# Patient Record
Sex: FEMALE | Race: WHITE | Employment: FULL TIME | URBAN - METROPOLITAN AREA
[De-identification: names, ages, dates, MRNs, and addresses within clinical notes are randomized per-mention and may not be internally consistent; named-entity substitution may affect disease eponyms.]

---

## 2017-04-02 ENCOUNTER — HOSPITAL ENCOUNTER (INPATIENT)
Age: 42
LOS: 1 days | Discharge: HOME | DRG: 885 | End: 2017-04-04
Attending: EMERGENCY MEDICINE
Payer: COMMERCIAL

## 2017-04-02 DIAGNOSIS — F32.A DEPRESSION, UNSPECIFIED DEPRESSION TYPE: Primary | ICD-10-CM

## 2017-04-02 DIAGNOSIS — R45.851 SUICIDAL IDEATION: ICD-10-CM

## 2017-04-02 DIAGNOSIS — Z00.00 GENERAL MEDICAL EXAM: ICD-10-CM

## 2017-04-02 LAB
ALBUMIN SERPL BCP-MCNC: 3.9 G/DL (ref 3.5–5)
ALBUMIN/GLOB SERPL: 1 {RATIO} (ref 1.1–2.2)
ALP SERPL-CCNC: 112 U/L (ref 45–117)
ALT SERPL-CCNC: 31 U/L (ref 12–78)
AMPHET UR QL SCN: NEGATIVE
ANION GAP BLD CALC-SCNC: 8 MMOL/L (ref 5–15)
APAP SERPL-MCNC: <2 UG/ML (ref 10–30)
APPEARANCE UR: ABNORMAL
AST SERPL W P-5'-P-CCNC: 17 U/L (ref 15–37)
BACTERIA URNS QL MICRO: ABNORMAL /HPF
BARBITURATES UR QL SCN: NEGATIVE
BASOPHILS # BLD AUTO: 0 K/UL (ref 0–0.1)
BASOPHILS # BLD: 0 % (ref 0–1)
BENZODIAZ UR QL: NEGATIVE
BILIRUB SERPL-MCNC: 0.3 MG/DL (ref 0.2–1)
BILIRUB UR QL: NEGATIVE
BUN SERPL-MCNC: 15 MG/DL (ref 6–20)
BUN/CREAT SERPL: 16 (ref 12–20)
CALCIUM SERPL-MCNC: 9.4 MG/DL (ref 8.5–10.1)
CANNABINOIDS UR QL SCN: NEGATIVE
CHLORIDE SERPL-SCNC: 105 MMOL/L (ref 97–108)
CO2 SERPL-SCNC: 28 MMOL/L (ref 21–32)
COCAINE UR QL SCN: NEGATIVE
COLOR UR: ABNORMAL
CREAT SERPL-MCNC: 0.94 MG/DL (ref 0.55–1.02)
DRUG SCRN COMMENT,DRGCM: NORMAL
EOSINOPHIL # BLD: 0.2 K/UL (ref 0–0.4)
EOSINOPHIL NFR BLD: 2 % (ref 0–7)
EPITH CASTS URNS QL MICRO: ABNORMAL /LPF
ERYTHROCYTE [DISTWIDTH] IN BLOOD BY AUTOMATED COUNT: 12.4 % (ref 11.5–14.5)
ETHANOL SERPL-MCNC: <10 MG/DL
GLOBULIN SER CALC-MCNC: 4 G/DL (ref 2–4)
GLUCOSE SERPL-MCNC: 87 MG/DL (ref 65–100)
GLUCOSE UR STRIP.AUTO-MCNC: NEGATIVE MG/DL
HCG UR QL: NEGATIVE
HCG UR QL: NEGATIVE
HCT VFR BLD AUTO: 41.1 % (ref 35–47)
HGB BLD-MCNC: 13.9 G/DL (ref 11.5–16)
HGB UR QL STRIP: ABNORMAL
HYALINE CASTS URNS QL MICRO: ABNORMAL /LPF (ref 0–5)
KETONES UR QL STRIP.AUTO: NEGATIVE MG/DL
LEUKOCYTE ESTERASE UR QL STRIP.AUTO: NEGATIVE
LYMPHOCYTES # BLD AUTO: 27 % (ref 12–49)
LYMPHOCYTES # BLD: 2.4 K/UL (ref 0.8–3.5)
MCH RBC QN AUTO: 31 PG (ref 26–34)
MCHC RBC AUTO-ENTMCNC: 33.8 G/DL (ref 30–36.5)
MCV RBC AUTO: 91.5 FL (ref 80–99)
METHADONE UR QL: NEGATIVE
MONOCYTES # BLD: 0.5 K/UL (ref 0–1)
MONOCYTES NFR BLD AUTO: 6 % (ref 5–13)
NEUTS SEG # BLD: 5.9 K/UL (ref 1.8–8)
NEUTS SEG NFR BLD AUTO: 65 % (ref 32–75)
NITRITE UR QL STRIP.AUTO: NEGATIVE
OPIATES UR QL: NEGATIVE
PCP UR QL: NEGATIVE
PH UR STRIP: 5 [PH] (ref 5–8)
PLATELET # BLD AUTO: 235 K/UL (ref 150–400)
POTASSIUM SERPL-SCNC: 3.9 MMOL/L (ref 3.5–5.1)
PROT SERPL-MCNC: 7.9 G/DL (ref 6.4–8.2)
PROT UR STRIP-MCNC: NEGATIVE MG/DL
RBC # BLD AUTO: 4.49 M/UL (ref 3.8–5.2)
RBC #/AREA URNS HPF: ABNORMAL /HPF (ref 0–5)
SALICYLATES SERPL-MCNC: <1.7 MG/DL (ref 2.8–20)
SODIUM SERPL-SCNC: 141 MMOL/L (ref 136–145)
SP GR UR REFRACTOMETRY: 1.02 (ref 1–1.03)
UROBILINOGEN UR QL STRIP.AUTO: 0.2 EU/DL (ref 0.2–1)
WBC # BLD AUTO: 9 K/UL (ref 3.6–11)
WBC URNS QL MICRO: ABNORMAL /HPF (ref 0–4)

## 2017-04-02 PROCEDURE — 80307 DRUG TEST PRSMV CHEM ANLYZR: CPT | Performed by: EMERGENCY MEDICINE

## 2017-04-02 PROCEDURE — 36415 COLL VENOUS BLD VENIPUNCTURE: CPT | Performed by: EMERGENCY MEDICINE

## 2017-04-02 PROCEDURE — 80053 COMPREHEN METABOLIC PANEL: CPT | Performed by: EMERGENCY MEDICINE

## 2017-04-02 PROCEDURE — 90791 PSYCH DIAGNOSTIC EVALUATION: CPT

## 2017-04-02 PROCEDURE — 81001 URINALYSIS AUTO W/SCOPE: CPT | Performed by: EMERGENCY MEDICINE

## 2017-04-02 PROCEDURE — 85025 COMPLETE CBC W/AUTO DIFF WBC: CPT | Performed by: EMERGENCY MEDICINE

## 2017-04-02 PROCEDURE — 81025 URINE PREGNANCY TEST: CPT

## 2017-04-02 PROCEDURE — 99284 EMERGENCY DEPT VISIT MOD MDM: CPT

## 2017-04-02 NOTE — IP AVS SNAPSHOT
[address] 
[phone] Patient: Sangita Rich MRN: [id number] :1975 You are allergic to the following Allergen Reactions Latex Itching [place] Anaphylaxis Clotrimazole Swelling Lavender (Lavandula Angustifolia) Shortness of Breath Recent Documentation Height Weight BMI OB Status Smoking Status 1.651 m 91.4 kg 33.55 kg/m2 Having regular periods Never Smoker About your hospitalization You were admitted on:  April 3, 2017 You last received care in the:  [address] You were discharged on:  2017 Unit phone number:  [phone] Why you were hospitalized Your primary diagnosis was:  Bipolar 1 Disorder (Hcc) Providers Seen During Your Hospitalizations Provider Role Specialty Primary office phone Nelson Metz MD Attending Provider Emergency Medicine [phone] Pallavi Leonardo MD Attending Provider Psychiatry [phone] Liliana Calle MD Attending Provider Psychiatry [phone] Your Primary Care Physician (PCP) Primary Care Physician Office Phone Office Fax OTHER, PHYS ** None ** ** None ** Follow-up Information Follow up With Details Comments Contact Info Addy Ying MD   Patient can only remember the practice name and not the physician Jose Rios On 2017 You have a 4:00pm appointment for medication management. [facility] 
[address], [facility] [address] [place], [address] [facility] 
[phone] [facility]  Couples counseling available here JERONIMO Vargas [address] [place], [address] 
[phone] [facility]  Call to schedule first time counseling appointment. 1000 [facility] [address] [place], [address] 
[phone] Fax [phone] Mavis Martel, PhD  Call to schedule first time counseling appointment. [address] [address] Louie Alexander 
[phone] Becky Vickers [facility]  Call to schedule first time counseling appointment. [address] [place], [address] 
[phone] Current Discharge Medication List  
  
CONTINUE these medications which have CHANGED Dose & Instructions Dispensing Information Comments Morning Noon Evening Bedtime  
 risperiDONE 1 mg tablet Commonly known as:  RisperDAL What changed:   
- medication strength 
- how much to take - when to take this Dose:  1 mg Take 1 Tab by mouth nightly. Indications: DEPRESSION ASSOCIATED WITH BIPOLAR DISORDER Quantity:  30 Tab Refills:  0 CONTINUE these medications which have NOT CHANGED Dose & Instructions Dispensing Information Comments Morning Noon Evening Bedtime ALEVE 220 mg Cap Generic drug:  naproxen sodium Dose:  1 Tab Take 1 Tab by mouth two (2) times daily as needed for Pain. Indications: Pain Refills:  0 ALLEGRA-D 24 HOUR 180-240 mg per tablet Generic drug:  fexofenadine-pseudoephedrine Dose:  1 Tab Take 1 Tab by mouth daily. Indications: ALLERGIC RHINITIS Refills:  0 CeleXA 40 mg tablet Generic drug:  citalopram  
   
 Dose:  40 mg Take 40 mg by mouth daily. Indications: major depressive disorder Refills:  0  
     
  
   
   
   
  
 cholecalciferol (vitamin D3) 4,000 unit Tab Dose:  4000 Units Take 4,000 Units by mouth daily. Indications: VITAMIN D DEFICIENCY Refills:  0  
     
  
   
   
   
  
 COLACE 100 mg capsule Generic drug:  docusate sodium Dose:  100 mg Take 100 mg by mouth daily. Indications: Constipation Refills:  0  
     
  
   
   
   
  
 lactase 3,000 unit tablet Commonly known as:  Jose Maria Ducking Dose:  1 Tab Take 1 Tab by mouth three (3) times daily as needed for Other (LACTOSE INTOLERANCE). Refills:  0 LaMICtal 150 mg tablet Generic drug:  lamoTRIgine Dose:  150 mg Take 150 mg by mouth two (2) times a day. Indications: MOOD Refills:  0 Where to Get Your Medications Information on where to get these meds will be given to you by the nurse or doctor. ! Ask your nurse or doctor about these medications  
  risperiDONE 1 mg tablet Discharge Instructions DISCHARGE SUMMARY 
 
NAME:Cherelle Ellis : 1975 MRN: [id number] The patient Meenakshi Valerio exhibits the ability to control behavior in a less restrictive environment. Patient's level of functioning is improving. No assaultive/destructive behavior has been observed for the past 24 hours. No suicidal/homicidal threat or behavior has been observed for the past 24 hours. There is no evidence of serious medication side effects. Patient has not been in physical or protective restraints for at least the past 24 hours. If weapons involved, how are they secured? No weapons involved. Is patient aware of and in agreement with discharge plan? Yes Arrangements for medication:  Prescriptions given to patient. Patient is a smoker and needs referral for smoking cessation? Patient is not a smoker. 1.  Patient referred to the following for smoking cessation with an appointment: 2. Patient was offered medication to assist with smoking cessation at discharge: 3.  Education for smoking cessation added to discharge instructions:  
 
Patient has a history of substance/alcohol abuse and requires a referral for treatment? No 
1. Patient referred to the following for substance/alcohol abuse treatment with an appointment: 2.  Patient was offered medication to assist with alcohol cessation at discharge: 3.  Education for substance/alcohol abuse added to discharge instructions:  
 
Copy of discharge instructions to  provider?:  Leopoldo Santo ([phone]) Arrangements for transportation home:   to  Psychiatric Advanced Care Directives- no 
Name of Decision maker if patient has Psychiatric Care Directive: None Patient was offered information, patient declined information. Keep all follow up appointments as scheduled, continue to take prescribed medications per physician instructions. Mental health crisis number:  636 or your local mental health crisis line number at [phone] DISCHARGE SUMMARY from Nurse The following personal items are in your possession at time of discharge: 
 
Dental Appliances: Other (comment) (mouth guard) Visual Aid: Glasses, With patient Home Medications: Locked Jewelry: None Clothing: Footwear, Pants, Shirt, Shorts, Socks, Sweater, Undergarments Other Valuables: Cell Phone, Eyeglasses, Keys, Personal toiletries, Purse, Other (comment) (adapter, power cords, braces(wrists)) Personal Items Sent to Safe:  (credit cards, store cards, ipad tablet, license) PATIENT INSTRUCTIONS: 
 
 
 
 
What to do at Home: 
Recommended activity: Activity as tolerated. If you experience any of the following symptoms thoughts of wanting to harm yourself or overwhelming anxiety, please follow up with 911 or your local mental health crisis line number at [phone]. *  Please give a list of your current medications to your Primary Care Provider. *  Please update this list whenever your medications are discontinued, doses are 
    changed, or new medications (including over-the-counter products) are added. *  Please carry medication information at all times in case of emergency situations. These are general instructions for a healthy lifestyle: No smoking/ No tobacco products/ Avoid exposure to second hand smoke Surgeon General's Warning:  Quitting smoking now greatly reduces serious risk to your health. Obesity, smoking, and sedentary lifestyle greatly increases your risk for illness A healthy diet, regular physical exercise & weight monitoring are important for maintaining a healthy lifestyle You may be retaining fluid if you have a history of heart failure or if you experience any of the following symptoms:  Weight gain of 3 pounds or more overnight or 5 pounds in a week, increased swelling in our hands or feet or shortness of breath while lying flat in bed. Please call your doctor as soon as you notice any of these symptoms; do not wait until your next office visit. Recognize signs and symptoms of STROKE: 
 
F-face looks uneven A-arms unable to move or move unevenly S-speech slurred or non-existent T-time-call 911 as soon as signs and symptoms begin-DO NOT go Back to bed or wait to see if you get better-TIME IS BRAIN. Warning Signs of HEART ATTACK Call 911 if you have these symptoms: 
? Chest discomfort. Most heart attacks involve discomfort in the center of the chest that lasts more than a few minutes, or that goes away and comes back. It can feel like uncomfortable pressure, squeezing, fullness, or pain. ? Discomfort in other areas of the upper body. Symptoms can include pain or discomfort in one or both arms, the back, neck, jaw, or stomach. ? Shortness of breath with or without chest discomfort. ? Other signs may include breaking out in a cold sweat, nausea, or lightheadedness. Don't wait more than five minutes to call [address]! Fast action can save your life. Calling 911 is almost always the fastest way to get lifesaving treatment. Emergency Medical Services staff can begin treatment when they arrive  up to an hour sooner than if someone gets to the hospital by car. The discharge information has been reviewed with the patient. The patient verbalized understanding. Discharge medications reviewed with the patient and appropriate educational materials and side effects teaching were provided. [facility]charge Orders [facility] Announcement We are excited to announce that we are making your provider's discharge notes available to you in [employer]. You will see these notes when they are completed and signed by the physician that discharged you from your recent hospital stay. If you have any questions or concerns about any information you see in [employer], please call the Health Information Department where you were seen or reach out to your Primary Care Provider for more information about your plan of care. Introducing [facility] [place] [facility]! [employer] introduces [employer] patient portal. Now you can access parts of your medical record, email your doctor's office, and request medication refills online. 1. In your internet browser, go to [email or web address] 2. Click on the First Time User? Click Here link in the Sign In box. You will see the New Member Sign Up page. 3. Enter your [employer] Access Code exactly as it appears below. You will not need to use this code after youve completed the sign-up process. If you do not sign up before the expiration date, you must request a new code. · [employer] Access Code: [email or web address] Expires: 7/1/2017  8:20 PM 
 
4. Enter the last four digits of your Social Security Number (xxxx) and Date of Birth (mm/dd/yyyy) as indicated and click Submit. You will be taken to the next sign-up page. 5. Create a [employer] ID. This will be your [employer] login ID and cannot be changed, so think of one that is secure and easy to remember. 6. Create a [employer] password. You can change your password at any time. 7. Enter your Password Reset Question and Answer.  This can be used at a later time if you forget your password. 8. Enter your e-mail address. You will receive e-mail notification when new information is available in [address] 9. Click Sign Up. You can now view and download portions of your medical record. 10. Click the Download Summary menu link to download a portable copy of your medical information. If you have questions, please visit the Frequently Asked Questions section of the [employer] website. Remember, [employer] is NOT to be used for urgent needs. For medical emergencies, dial 911. Now available from your iPhone and Android! General Information Please provide this summary of care documentation to your next provider. Patient Signature:  ____________________________________________________________ Date:  ____________________________________________________________  
  
[facility] Provider Signature:  ____________________________________________________________ Date:  ____________________________________________________________

## 2017-04-02 NOTE — IP AVS SNAPSHOT
Current Discharge Medication List  
  
CONTINUE these medications which have CHANGED Dose & Instructions Dispensing Information Comments Morning Noon Evening Bedtime  
 risperiDONE 1 mg tablet Commonly known as:  RisperDAL What changed:   
- medication strength 
- how much to take - when to take this Dose:  1 mg Take 1 Tab by mouth nightly. Indications: DEPRESSION ASSOCIATED WITH BIPOLAR DISORDER Quantity:  30 Tab Refills:  0 CONTINUE these medications which have NOT CHANGED Dose & Instructions Dispensing Information Comments Morning Noon Evening Bedtime ALEVE 220 mg Cap Generic drug:  naproxen sodium Dose:  1 Tab Take 1 Tab by mouth two (2) times daily as needed for Pain. Indications: Pain Refills:  0 ALLEGRA-D 24 HOUR 180-240 mg per tablet Generic drug:  fexofenadine-pseudoephedrine Dose:  1 Tab Take 1 Tab by mouth daily. Indications: ALLERGIC RHINITIS Refills:  0 CeleXA 40 mg tablet Generic drug:  citalopram  
   
 Dose:  40 mg Take 40 mg by mouth daily. Indications: major depressive disorder Refills:  0  
     
  
   
   
   
  
 cholecalciferol (vitamin D3) 4,000 unit Tab Dose:  4000 Units Take 4,000 Units by mouth daily. Indications: VITAMIN D DEFICIENCY Refills:  0  
     
  
   
   
   
  
 COLACE 100 mg capsule Generic drug:  docusate sodium Dose:  100 mg Take 100 mg by mouth daily. Indications: Constipation Refills:  0  
     
  
   
   
   
  
 lactase 3,000 unit tablet Commonly known as:  [place] Dose:  1 Tab Take 1 Tab by mouth three (3) times daily as needed for Other (LACTOSE INTOLERANCE). Refills:  0 LaMICtal 150 mg tablet Generic drug:  lamoTRIgine Dose:  150 mg Take 150 mg by mouth two (2) times a day. Indications: MOOD Refills:  0 Where to Get Your Medications Information on where to get these meds will be given to you by the nurse or doctor. ! Ask your nurse or doctor about these medications  
  risperiDONE 1 mg tablet

## 2017-04-02 NOTE — IP AVS SNAPSHOT
Summary of Care Report The Summary of Care report has been created to help improve care coordination. Users with access to [employer] or [address] (Web-based application) may access additional patient information including the Discharge Summary. If you are not currently a [address] user and need more information, please call the number listed below in the [place] [address] section and ask to be connected with Medical Records. Facility Information Name Address Phone [address] [phone] [phone] Patient Information Patient Name Sex  Zack Sigala A ([id number]) Female 1975 Discharge Information Admitting Provider Service Area Unit Lucy Lovett MD / [phone] [address] [facility] / [phone] Discharge Provider Discharge Date/Time Discharge Disposition Destination Irma Hutchison MD / [phone] 17 1452 [facility] (none) Patient Language Language ENGLISH [13] Hospital Problems as of 2017  Reviewed: 4/3/2017  1:10 PM by Irma Hutchison MD  
  
  
  
 Class Noted - Resolved Last Modified POA Active Problems * (Principal)Bipolar 1 disorder ([facility] [address]  4/3/2017 - Present 4/3/2017 by Irma Hutchison MD Unknown Entered by Lucy Lovett MD  
  
Non-Hospital Problems as of 2017  Reviewed: 4/3/2017  1:10 PM by Irma Hutchison MD  
 None You are allergic to the following Allergen Reactions Latex Itching [place] Anaphylaxis Clotrimazole Swelling Lavender (Lavandula Angustifolia) Shortness of Breath Current Discharge Medication List  
  
CONTINUE these medications which have CHANGED Dose & Instructions Dispensing Information Comments  
 risperiDONE 1 mg tablet Commonly known as:  RisperDAL What changed:   
- medication strength - how much to take - when to take this Dose:  1 mg Take 1 Tab by mouth nightly. Indications: DEPRESSION ASSOCIATED WITH BIPOLAR DISORDER Quantity:  30 Tab Refills:  0 CONTINUE these medications which have NOT CHANGED Dose & Instructions Dispensing Information Comments ALEVE 220 mg Cap Generic drug:  naproxen sodium Dose:  1 Tab Take 1 Tab by mouth two (2) times daily as needed for Pain. Indications: Pain Refills:  0 ALLEGRA-D 24 HOUR 180-240 mg per tablet Generic drug:  fexofenadine-pseudoephedrine Dose:  1 Tab Take 1 Tab by mouth daily. Indications: ALLERGIC RHINITIS Refills:  0 CeleXA 40 mg tablet Generic drug:  citalopram  
 Dose:  40 mg Take 40 mg by mouth daily. Indications: major depressive disorder Refills:  0  
   
 cholecalciferol (vitamin D3) 4,000 unit Tab Dose:  4000 Units Take 4,000 Units by mouth daily. Indications: VITAMIN D DEFICIENCY Refills:  0  
   
 COLACE 100 mg capsule Generic drug:  docusate sodium Dose:  100 mg Take 100 mg by mouth daily. Indications: Constipation Refills:  0  
   
 lactase 3,000 unit tablet Commonly known as:  Hui [place] Dose:  1 Tab Take 1 Tab by mouth three (3) times daily as needed for Other (LACTOSE INTOLERANCE). Refills:  0 LaMICtal 150 mg tablet Generic drug:  lamoTRIgine Dose:  150 mg Take 150 mg by mouth two (2) times a day. Indications: MOOD Refills:  0 Follow-up Information Follow up With Details Comments Contact Info Addy Ying MD   Patient can only remember the practice name and not the physician Delonte Diaz On 4/18/2017 You have a 4:00pm appointment for medication management. [facility] 
[address], [facility] [address] [place], [address] [facility] 
[phone] [facility]  Couples counseling available here JERONIMO Vargas [address] [place], [address] [place] [address] 
[phone] [facility]  Call to schedule first time counseling appointment. [address] [facility] [address] [place], [address] 
[phone] Fax [phone] Mavis Bush, PhD  Call to schedule first time counseling appointment. [address] [facility] 
[phone] Mare Schmitz. [facility]  Call to schedule first time counseling appointment. [address] [place], [address] 
[phone] Discharge Instructions DISCHARGE SUMMARY 
 
NAME:Cherelle Liu : 1975 MRN: [id number] The patient 71 Dany Valerio exhibits the ability to control behavior in a less restrictive environment. Patient's level of functioning is improving. No assaultive/destructive behavior has been observed for the past 24 hours. No suicidal/homicidal threat or behavior has been observed for the past 24 hours. There is no evidence of serious medication side effects. Patient has not been in physical or protective restraints for at least the past 24 hours. If weapons involved, how are they secured? No weapons involved. Is patient aware of and in agreement with discharge plan? Yes Arrangements for medication:  Prescriptions given to patient. Patient is a smoker and needs referral for smoking cessation? Patient is not a smoker. 1.  Patient referred to the following for smoking cessation with an appointment: 2. Patient was offered medication to assist with smoking cessation at discharge: 3.  Education for smoking cessation added to discharge instructions:  
 
Patient has a history of substance/alcohol abuse and requires a referral for treatment? No 
1. Patient referred to the following for substance/alcohol abuse treatment with an appointment: 2. Patient was offered medication to assist with alcohol cessation at discharge: 3.  Education for substance/alcohol abuse added to discharge instructions:  
 
Copy of discharge instructions to  provider?:  Serge Ramirez ([phone]) Arrangements for transportation home:   to  Psychiatric Advanced Care Directives- no 
Name of Decision maker if patient has Psychiatric Care Directive: None Patient was offered information, patient declined information. Keep all follow up appointments as scheduled, continue to take prescribed medications per physician instructions. Mental health crisis number:  013 or your local mental health crisis line number at [phone] DISCHARGE SUMMARY from Nurse The following personal items are in your possession at time of discharge: 
 
Dental Appliances: Other (comment) (mouth guard) Visual Aid: Glasses, With patient Home Medications: Locked Jewelry: None Clothing: Footwear, Pants, Shirt, Shorts, Socks, Sweater, Undergarments Other Valuables: Cell Phone, Eyeglasses, Keys, Personal toiletries, Purse, Other (comment) (adapter, power cords, braces(wrists)) Personal Items Sent to Safe:  (credit cards, store cards, ipad tablet, license) PATIENT INSTRUCTIONS: 
 
 
 
 
What to do at Home: 
Recommended activity: Activity as tolerated. If you experience any of the following symptoms thoughts of wanting to harm yourself or overwhelming anxiety, please follow up with 911 or your local mental health crisis line number at [phone]. *  Please give a list of your current medications to your Primary Care Provider. *  Please update this list whenever your medications are discontinued, doses are 
    changed, or new medications (including over-the-counter products) are added. *  Please carry medication information at all times in case of emergency situations. These are general instructions for a healthy lifestyle: No smoking/ No tobacco products/ Avoid exposure to second hand smoke Surgeon General's Warning:  Quitting smoking now greatly reduces serious risk to your health. Obesity, smoking, and sedentary lifestyle greatly increases your risk for illness A healthy diet, regular physical exercise & weight monitoring are important for maintaining a healthy lifestyle You may be retaining fluid if you have a history of heart failure or if you experience any of the following symptoms:  Weight gain of 3 pounds or more overnight or 5 pounds in a week, increased swelling in our hands or feet or shortness of breath while lying flat in bed. Please call your doctor as soon as you notice any of these symptoms; do not wait until your next office visit. Recognize signs and symptoms of STROKE: 
 
F-face looks uneven A-arms unable to move or move unevenly S-speech slurred or non-existent T-time-call 911 as soon as signs and symptoms begin-DO NOT go Back to bed or wait to see if you get better-TIME IS BRAIN. Warning Signs of HEART ATTACK Call 911 if you have these symptoms: 
? Chest discomfort. Most heart attacks involve discomfort in the center of the chest that lasts more than a few minutes, or that goes away and comes back. It can feel like uncomfortable pressure, squeezing, fullness, or pain. ? Discomfort in other areas of the upper body. Symptoms can include pain or discomfort in one or both arms, the back, neck, jaw, or stomach. ? Shortness of breath with or without chest discomfort. ? Other signs may include breaking out in a cold sweat, nausea, or lightheadedness. Don't wait more than five minutes to call [address]! Fast action can save your life. Calling 911 is almost always the fastest way to get lifesaving treatment. Emergency Medical Services staff can begin treatment when they arrive  up to an hour sooner than if someone gets to the hospital by car. The discharge information has been reviewed with the patient.   The patient verbalized understanding. Discharge medications reviewed with the patient and appropriate educational materials and side effects teaching were provided. Chart Review Routing History No Routing History on File

## 2017-04-03 PROBLEM — F31.9 BIPOLAR 1 DISORDER (HCC): Status: ACTIVE | Noted: 2017-04-03

## 2017-04-03 PROCEDURE — 74011250637 HC RX REV CODE- 250/637

## 2017-04-03 PROCEDURE — 65220000003 HC RM SEMIPRIVATE PSYCH

## 2017-04-03 PROCEDURE — 74011250637 HC RX REV CODE- 250/637: Performed by: PSYCHIATRY & NEUROLOGY

## 2017-04-03 RX ORDER — RISPERIDONE 0.5 MG/1
0.5 TABLET, FILM COATED ORAL
Status: DISCONTINUED | OUTPATIENT
Start: 2017-04-03 | End: 2017-04-03

## 2017-04-03 RX ORDER — RISPERIDONE 0.5 MG/1
0.5 TABLET, FILM COATED ORAL
COMMUNITY
End: 2017-04-04

## 2017-04-03 RX ORDER — OLANZAPINE 5 MG/1
5 TABLET ORAL
Status: DISCONTINUED | OUTPATIENT
Start: 2017-04-03 | End: 2017-04-04 | Stop reason: HOSPADM

## 2017-04-03 RX ORDER — RISPERIDONE 0.5 MG/1
0.5 TABLET, FILM COATED ORAL
Status: DISCONTINUED | OUTPATIENT
Start: 2017-04-03 | End: 2017-04-04

## 2017-04-03 RX ORDER — FEXOFENADINE HCL AND PSEUDOEPHEDRINE HCI 180; 240 MG/1; MG/1
1 TABLET, EXTENDED RELEASE ORAL DAILY
COMMUNITY

## 2017-04-03 RX ORDER — RISPERIDONE 1 MG/ML
0.5 SOLUTION ORAL
Status: DISCONTINUED | OUTPATIENT
Start: 2017-04-03 | End: 2017-04-03

## 2017-04-03 RX ORDER — ADHESIVE BANDAGE
30 BANDAGE TOPICAL DAILY PRN
Status: DISCONTINUED | OUTPATIENT
Start: 2017-04-03 | End: 2017-04-04 | Stop reason: HOSPADM

## 2017-04-03 RX ORDER — CITALOPRAM 20 MG/1
40 TABLET, FILM COATED ORAL ONCE
Status: COMPLETED | OUTPATIENT
Start: 2017-04-03 | End: 2017-04-03

## 2017-04-03 RX ORDER — ACETAMINOPHEN 325 MG/1
650 TABLET ORAL
Status: DISCONTINUED | OUTPATIENT
Start: 2017-04-03 | End: 2017-04-04 | Stop reason: HOSPADM

## 2017-04-03 RX ORDER — DOCUSATE SODIUM 100 MG/1
100 CAPSULE, LIQUID FILLED ORAL DAILY
Status: DISCONTINUED | OUTPATIENT
Start: 2017-04-03 | End: 2017-04-04 | Stop reason: HOSPADM

## 2017-04-03 RX ORDER — LORAZEPAM 1 MG/1
1 TABLET ORAL
Status: DISCONTINUED | OUTPATIENT
Start: 2017-04-03 | End: 2017-04-04 | Stop reason: HOSPADM

## 2017-04-03 RX ORDER — CITALOPRAM 20 MG/1
40 TABLET, FILM COATED ORAL DAILY
Status: DISCONTINUED | OUTPATIENT
Start: 2017-04-04 | End: 2017-04-04 | Stop reason: HOSPADM

## 2017-04-03 RX ORDER — LAMOTRIGINE 150 MG/1
150 TABLET ORAL 2 TIMES DAILY
COMMUNITY

## 2017-04-03 RX ORDER — LORAZEPAM 2 MG/ML
2 INJECTION INTRAMUSCULAR
Status: DISCONTINUED | OUTPATIENT
Start: 2017-04-03 | End: 2017-04-04 | Stop reason: HOSPADM

## 2017-04-03 RX ORDER — CHOLECALCIFEROL (VITAMIN D3) 125 MCG
1 CAPSULE ORAL
COMMUNITY
Start: 2017-03-16 | End: 2017-04-07

## 2017-04-03 RX ORDER — IBUPROFEN 200 MG
1 TABLET ORAL
Status: DISCONTINUED | OUTPATIENT
Start: 2017-04-03 | End: 2017-04-04 | Stop reason: HOSPADM

## 2017-04-03 RX ORDER — ZOLPIDEM TARTRATE 5 MG/1
5 TABLET ORAL
Status: DISCONTINUED | OUTPATIENT
Start: 2017-04-03 | End: 2017-04-04 | Stop reason: HOSPADM

## 2017-04-03 RX ORDER — RISPERIDONE 0.5 MG/1
0.5 TABLET, FILM COATED ORAL DAILY
Status: DISCONTINUED | OUTPATIENT
Start: 2017-04-03 | End: 2017-04-03

## 2017-04-03 RX ORDER — BENZTROPINE MESYLATE 1 MG/ML
2 INJECTION INTRAMUSCULAR; INTRAVENOUS
Status: DISCONTINUED | OUTPATIENT
Start: 2017-04-03 | End: 2017-04-04 | Stop reason: HOSPADM

## 2017-04-03 RX ORDER — CHOLECALCIFEROL TAB 125 MCG (5000 UNIT) 125 MCG
5000 TAB ORAL DAILY
Status: ON HOLD | COMMUNITY
End: 2017-04-03

## 2017-04-03 RX ORDER — CHOLECALCIFEROL (VITAMIN D3) 125 MCG
1 CAPSULE ORAL
COMMUNITY

## 2017-04-03 RX ORDER — RISPERIDONE 0.5 MG/1
0.5 TABLET, FILM COATED ORAL 2 TIMES DAILY
Status: DISCONTINUED | OUTPATIENT
Start: 2017-04-03 | End: 2017-04-04

## 2017-04-03 RX ORDER — BENZTROPINE MESYLATE 2 MG/1
2 TABLET ORAL
Status: DISCONTINUED | OUTPATIENT
Start: 2017-04-03 | End: 2017-04-04 | Stop reason: HOSPADM

## 2017-04-03 RX ORDER — DOCUSATE SODIUM 100 MG/1
100 CAPSULE, LIQUID FILLED ORAL DAILY
COMMUNITY

## 2017-04-03 RX ORDER — CITALOPRAM 40 MG/1
40 TABLET, FILM COATED ORAL DAILY
COMMUNITY

## 2017-04-03 RX ORDER — IBUPROFEN 400 MG/1
400 TABLET ORAL
Status: DISCONTINUED | OUTPATIENT
Start: 2017-04-03 | End: 2017-04-04 | Stop reason: HOSPADM

## 2017-04-03 RX ADMIN — RISPERIDONE 0.5 MG: 0.5 TABLET, FILM COATED ORAL at 17:06

## 2017-04-03 RX ADMIN — CITALOPRAM HYDROBROMIDE 40 MG: 20 TABLET ORAL at 14:10

## 2017-04-03 RX ADMIN — RISPERIDONE 0.5 MG: 0.5 TABLET, FILM COATED ORAL at 05:14

## 2017-04-03 RX ADMIN — DOCUSATE SODIUM 100 MG: 100 CAPSULE, LIQUID FILLED ORAL at 05:14

## 2017-04-03 RX ADMIN — RISPERIDONE 0.5 MG: 0.5 TABLET, FILM COATED ORAL at 21:06

## 2017-04-03 RX ADMIN — LAMOTRIGINE 150 MG: 100 TABLET ORAL at 17:06

## 2017-04-03 NOTE — BSMART NOTE
Comprehensive Assessment Form Part 1      Section I - Disposition    Axis I - Bipolar Disorder   Axis II - Deferred  Axis III -   Past Medical History:   Diagnosis Date    ADHD (attention deficit hyperactivity disorder)     Bipolar 1 disorder ([facility] [address]        Axis IV - Relationship, financial and occupational stressors  Axis V - 27      The Medical Doctor to Psychiatrist conference was not completed. The Medical Doctor is in agreement with Psychiatrist disposition because of (reason) Voluntary admission is recommended. The plan is admit to general unit at [facility]. The on-call Psychiatrist consulted was Dr. Mirian Morris. The admitting Psychiatrist will be Dr. Ezekiel Machado. The admitting Diagnosis is Bipolar. The Payor source is  [email or web address]. Section II - Integrated Summary  Summary:  Patient came in reporting suicidal ideation. Patient reported she has ongoing thoughts when overwhelmed but tonight wants to follow through. Patient reported she kept wanting to \"open my wrist.\"  Patient denied prior attempts. Patient denied HI currently. Patient reported she and her  broke up with a girlfriend tonight which is the precipitant for this episode. Patient also reported recent charging on her credit card as a stressor as well as quitting her job Friday due to stress. Patient denied prior admissions. She is currently seeing Dr. Rodo Lopez in [facility] for therapy and Serge Ramirez NP for medication. Patient reported compliance with her Risperdal .5 mg qhs, Celexa 40 mg, and Lamictal 150 mg BID. Patient is alert, oriented, and cooperative. Patient reporting uncertainty as to what she might do if she returns home this evening. The patienthas demonstrated mental capacity to provide informed consent. The information is given by the patient. The Chief Complaint is SI. The Precipitant Factors are relationship, job, and financial issues.   Previous Hospitalizations: NA  The patient has not previously been in restraints. Current Psychiatrist and/or  is RONNIE Daly Dr.. Lethality Assessment:    The potential for suicide noted by the following: defined plan and ideation . The potential for homicide is not noted. The patient has not been a perpetrator of sexual or physical abuse. There are not pending charges. The patient is felt to be at risk for self harm or harm to others. The attending nurse was advised that security has not been notified. Section III - Psychosocial  The patient's overall mood and attitude is depressed. Feelings of helplessness and hopelessness are observed by verbal statements. Generalized anxiety is not observed. Panic is not observed. Phobias are not observed. Obsessive compulsive tendencies are not observed. Section IV - Mental Status Exam  The patient's appearance shows no evidence of impairment. The patient's behavior shows no evidence of impairment. The patient is oriented to time, place, person and situation. The patient's speech shows no evidence of impairment. The patient's mood is depressed. The range of affect is flat. The patient's thought content demonstrates no evidence of impairment. The thought process shows no evidence of impairment. The patient's perception shows no evidence of impairment. The patient's memory shows no evidence of impairment. The patient's appetite is decreased and shows signs of weight loss. The patient's sleep has evidence of hypersomnia. The patient shows no insight. The patient's judgement is psychologically impaired. Section V - Substance Abuse  The patient is not using substances. Section VI - Living Arrangements  The patient is . The patient lives with a spouse. The patient has no children. The patient does plan to return home upon discharge. The patient does not have legal issues pending. The patient's source of income comes from employment.   Jew and cultural practices have not been voiced at this time. The patient's greatest support comes from mom and a friend and this person will not be involved with the treatment. The patient has not been in an event described as horrible or outside the realm of ordinary life experience either currently or in the past.  The patient has not been a victim of sexual/physical abuse. Section VII - Other Areas of Clinical Concern  The highest grade achieved is college with the overall quality of school experience being described as NA. The patient is currently employed and speaks [place] as a primary language. The patient has no communication impairments affecting communication. The patient's preference for learning can be described as: can read and write adequately. The patient's hearing is normal.  The patient's vision is impaired and  wears glasses or contacts.       Diogenes Osman, JOSE

## 2017-04-03 NOTE — PROGRESS NOTES
Problem: Depressed Mood (Adult/Pediatric)  Goal: *STG: Participates in treatment plan  Outcome: Progressing Towards Goal  Review meds, out on unit social w peers and staff. Thoughts organized, logical and goal directed. Mood and affect sad, tearful and \"stressed\". Describes an open marriage and work as her top stressors. Insight into her twice a month therapy sessions as not effective and that her new job schedule does not allow for her to work with her current therapist. Requesting assistance in finding a new therapist. Medication compliant at home per her report. Denies SI at this time however describes racing negative thoughts. Pt daily goal is to talk with , meet with tx team and rest. Staff focus is on coping skills education, offering support and encourage progress towards goals.

## 2017-04-03 NOTE — ED TRIAGE NOTES
Patient arrives to ED with c/o suicidal ideations x 2 months, patient states stress in her relations as a stressor and remembering her fathers death 2 years ago, no attempt noted, patient contracts for safety while in the ED.

## 2017-04-03 NOTE — BH NOTES
GROUP THERAPY PROGRESS NOTE    71 Dany Valerio participated in an Afternoon Process Group on DBT STOP Distress Tolerance Skills. Group time: 45 minutes. Personal goal for participation: To better understand the possibility of recognizing signals of distress and STOP strategies to manage the potential for impulsive behavior. Goal orientation: The patient will be able to identify the bodily sensations, triggering thoughts and situations that might lead to impulsive behaviors one might wish to avoid. The patient will also consider how to begin practicing STOP to delay an unwanted distress response. Group therapy participation: With prompting, this patient actively participated in the group. Therapeutic interventions reviewed and discussed: The group members were asked to take turns reading from a handout and worksheet on DBT STOP Distress Tolerance Skills and discussing the suggestions. Distress signals included actions at or near an emotional loss of control, bodily sensations, thoughts that might accompany an emotional buildup, triggering events, and feelings associated with an emotional meltdown or explosion. They also reviewed STOP: 1) Consider what one might do to tell oneself to stop; 2) Pay attention to breathing; 3) How to observe and check facts in a nonjudgmental fashion; and 4) What one might do to proceed effectively. The group members were encouraged to keep the handout and worksheet. Impression of participation: The patient asked several questions and was using the worksheet to document her own answers. She looked fully oriented and engaged in the group session.

## 2017-04-03 NOTE — BH NOTES
0040  Pt arrived ambulatory onto 7W from ED. 45 y/o female pt admitted under Fernando Cloud/Tuan for Bipolar disorder. Pt said she had had SI, but contracted for safety now. She said she felt overwhelmed  & did not feel safe at home because of her SI which came when she & her  broke up with their girlfriend who went back to [place]. Pt is A&O; steady on her feet. No distress noted, but pt said she has chronic jaw pain which increases when she is stressed. Monitoring continues. Primary Nurse Apple Matthews RN and Ricki Lan RN performed a dual skin assessment on this patient No impairment noted  Manoj score is 23. Pt has a healed scar on distal right forearm/wrist area. No tattoos, rashes, or wounds noted on pt. Pressure Ulcer Prevention Protocol initiated. Pt walks often & is self care.

## 2017-04-03 NOTE — BH NOTES
Patient attended group and appeared juan fatigue as she expressed that she was just admitted at 1am this morning. Patient expressed her goal for today was to meet with treatment team and express her mental concerns.

## 2017-04-03 NOTE — ED PROVIDER NOTES
HPI Comments: 46 y.o. female with past medical history significant for bipolar 1 disorder and ADHD who presents from home with chief complaint of suicidal ideations. Pt complains of suicidal ideations. Pt reports that she has two sprained elbows and one sprained wrist. Pt notes that she has allergy induced asthma and is allergic to Latex, almond, Clotrimazole, and lavender. Pt reports that she has heavy and painful menstrual periods. Pt also reports that she has hemorrhoids and chronic constipation from her medications. Pt denies any thyroid problems. There are no other acute medical concerns at this time. Social hx: Non-smoker, no EtOH use. Note written by Angelito Bundy, as dictated by Marilin Urias MD 9:45 PM        The history is provided by the patient. No  was used. Past Medical History:   Diagnosis Date    ADHD (attention deficit hyperactivity disorder)     Bipolar 1 disorder ([facility] [address]        History reviewed. No pertinent surgical history. History reviewed. No pertinent family history. Social History     Social History    Marital status: SINGLE     Spouse name: N/A    Number of children: N/A    Years of education: N/A     Occupational History    Not on file. Social History Main Topics    Smoking status: Never Smoker    Smokeless tobacco: Not on file    Alcohol use No    Drug use: No    Sexual activity: Not on file     Other Topics Concern    Not on file     Social History Narrative    No narrative on file         ALLERGIES: Latex; [place]; Clotrimazole; and Lavender (lavandula angustifolia)    Review of Systems   Constitutional: Negative for activity change, appetite change and fatigue. HENT: Negative for ear pain, facial swelling, sore throat and trouble swallowing. Eyes: Negative for pain, discharge and visual disturbance. Respiratory: Negative for chest tightness, shortness of breath and wheezing.     Cardiovascular: Negative for chest pain and palpitations. Gastrointestinal: Positive for constipation. Negative for abdominal pain, blood in stool, nausea and vomiting. Genitourinary: Negative for difficulty urinating, flank pain and hematuria. Musculoskeletal: Negative for myalgias and neck pain. Skin: Negative for color change and rash. Neurological: Negative for dizziness, weakness, numbness and headaches. Hematological: Negative for adenopathy. Does not bruise/bleed easily. Psychiatric/Behavioral: Positive for suicidal ideas. Negative for confusion and sleep disturbance. All other systems reviewed and are negative. Vitals:    04/02/17 2016   BP: 121/86   Pulse: 80   Resp: 16   Temp: 98.3 °F (36.8 °C)   SpO2: 98%   Weight: 91.4 kg (201 lb 9.6 oz)   Height: 5' 5\" (1.651 m)            Physical Exam   Constitutional: She is oriented to person, place, and time. She appears well-developed and well-nourished. No distress. HENT:   Head: Normocephalic and atraumatic. Nose: Nose normal.   Mouth/Throat: Oropharynx is clear and moist.   Eyes: Conjunctivae and EOM are normal. Pupils are equal, round, and reactive to light. No scleral icterus. Neck: Normal range of motion. Neck supple. No JVD present. No tracheal deviation present. No thyromegaly present. No carotid bruits noted. Cardiovascular: Normal rate, regular rhythm, normal heart sounds and intact distal pulses. Exam reveals no gallop and no friction rub. No murmur heard. Pulmonary/Chest: Effort normal and breath sounds normal. No respiratory distress. She has no wheezes. She has no rales. She exhibits no tenderness. Abdominal: Soft. Bowel sounds are normal. She exhibits no distension and no mass. There is no tenderness. There is no rebound and no guarding. Musculoskeletal: Normal range of motion. She exhibits no edema or tenderness. Lymphadenopathy:     She has no cervical adenopathy. Neurological: She is alert and oriented to person, place, and time.  She has normal reflexes. No cranial nerve deficit. Coordination normal.   Skin: Skin is warm and dry. No rash noted. No erythema. Nursing note and vitals reviewed. Note written by Angelito Cortes, as dictated by Jovany Toledo MD 9:45 PM        MDM  Number of Diagnoses or Management Options  Depression, unspecified depression type: new and requires workup  General medical exam: new and requires workup  Suicidal ideation: new and requires workup     Amount and/or Complexity of Data Reviewed  Clinical lab tests: ordered and reviewed  Decide to obtain previous medical records or to obtain history from someone other than the patient: yes  Review and summarize past medical records: yes  Discuss the patient with other providers: yes    Risk of Complications, Morbidity, and/or Mortality  Presenting problems: high  Diagnostic procedures: high  Management options: high    Patient Progress  Patient progress: stable    ED Course       Procedures     [facility] She agrees with admission to the psychiatric service for further evaluation. Laboratory evaluation and physical examination are normal. The patient is cleared medically for admission to the psychiatric unit. She remains stable in the emergency department.

## 2017-04-03 NOTE — BH NOTES
Behavioral Health Interdisciplinary Rounds     Patient Name: Jazzmine Greene  Age: 46 y.o.   Room/Bed:  729/  Primary Diagnosis: <principal problem not specified>   Admission Status: Voluntary     Readmission within 30 days: no  Power of  in place: no  Patient requires a blocked bed: no          Reason for blocked bed: N/A    VTE Prophylaxis: Not indicated  Mobility needs/Fall risk: no    Nutritional Plan: no  Consults: no         Labs/Testing due today?: no    Sleep hours:  2.25+      Participation in Care/Groups:  No--new admit  Medication Compliant?: Yes  PRNS (last 24 hours): None    Restraints (last 24 hours):  no  Substance Abuse:  no  CIWA (range last 24 hours): na COWS (range last 24 hours): na  Alcohol screening (AUDIT) completed -     If applicable, date SBIRT discussed in treatment team AND documented: N/A  Tobacco - patient is a smoker: no   Date tobacco education completed by RN: N/A  24 hour chart check complete: yes     Patient goal(s) for today:   Treatment team focus/goals: psychosocial  LOS:  0  Expected LOS: TBD  Financial concerns/prescription coverage: [employer]  Date of last family contact: None      Family requesting physician contact today: No  Discharge plan: Return home       Outpatient provider(s): Young Haley; to be linked to new therapist    Participating treatment team members: Dyana Luis MSW; Dr. Grecia Perez MD; Jaycob Blank RN; Odilia Lorenzo, CristiD

## 2017-04-03 NOTE — BH NOTES
Pt came in with several meds in her bag from home:  Epipen--2 of them  Proair HFA (Albuterol Sulfate)  Benadryl 25 mg capsules--5 in containers  Lactose chewable tabs--2  Advil in container  Meclizine 25 mg tabs in a container  Tums? ? In a pink container  Vitamin D 4000 IU  Bottle  Stool Softener bottle  Naproxen 220 mg tabs in bottle  Lamotrigine 150 mg bottle  Citalopram HBR 40 mg tab bottle  Risperidone 0.5 mg pill bottle  & 2 containers holding daily pills in them (7 days of doses)    All of these meds bagged & put in cabinet in med room. Cleremi Pabonts Clemon Kilrob They are in 3 bags.

## 2017-04-03 NOTE — BH NOTES
PSYCHOSOCIAL ASSESSMENT  :Patient identifying info:  69 Dany Valerio is a 46 y.o., female admitted 4/2/2017  9:17 PM     Presenting problem and precipitating factors: Patient and her  shared a girlfriend but reached a conclusion that it was not a workable situation. They had previously decided on an open marriage. The breakup was stressful as well as charging up her own credit card and quitting her job due to stress on the job. Patient was feeling overwhelmed and had suicidal ideations. She states she feels depressed and anxious. Current psychiatric providers and contact info: Sean Sandoval NP [phone] and Dr. Lashanda Hunter. Her new job does not allow her to continue seeing therapist due to time constraints so she is requesting a new referral and appointment. Previous psychiatric services/providers and response to treatment: No other psych admissions. Substance abuse history:  No issues  Social History   Substance Use Topics    Smoking status: Never Smoker    Smokeless tobacco: Not on file    Alcohol use No       Family constellation: Patient has no children    Is significant other involved? 's name is Cas Craig @ [phone]. Tried to reach him but had to leave a voicemail. Also left  contact information here at [facility] for Mr. Gina Cardozo. Describe support system: Patient states her mother and many friends are her support system    Describe living arrangements and home environment: Patient lives with her . There have been relationship issues and stress lately due to open marriage but patient does plan to return there at discharge. Health issues:   Hospital Problems  Date Reviewed: 4/3/2017          Codes Class Noted POA    * (Principal)[facility] ICD-10-CM: F31.9  ICD-9-CM: 296.7  4/3/2017 Unknown              Trauma history:  None noted    Legal issues: No issues.     History of  service: N/A    Financial status: Employed    Gnosticist/cultural factors: Nothing voiced    Education/work history: Patient has a college education and has worked as a teacher. Have you been licensed as a carmela care professional (current or ): No    Leisure and recreation preferences:  Wants more time for art and creativity. Patient also enjoys baking.     Describe coping skills:  Seeking assistance with more functional coping skills    Cornell Baker  4/3/2017

## 2017-04-03 NOTE — BH NOTES
GROUP THERAPY PROGRESS NOTE    [place] A Donnald Alpers participated in the General unit's Process Group, with a focus identifying feelings, planning for the day, and an introduction to [facility] [employer],\" also known as a [address] Group time: 65 minutes. Personal goal for participation: To increase the capacity to improve ones mood and structure for today and long-term. The patient will become more aware of personal responsibility and developing a road map for one's recovery. Goal orientation: The patient will be able to identify their feelings, develop a plan for structuring their day, and discharge planning to improve ones life and on-going treatment planning. Group therapy participation: With prompting, this patient actively participated in the group. Therapeutic interventions reviewed and discussed: The group members were asked to introduce themselves to each other and to see if they could identify an emotion they are having and/or let the group know what they want to focus on for the day as they continue to make discharge plans. They were all asked to consider filling in as a group the following elements of a   drawing of a house with multiple levels:  1) foundation - values that govern your life; 2) first floor with a door  behaviors would like to manage and feel more control over or areas you want to change; the door represents an opportunity to list or draw things that you keep hidden from others; 3) second floor  list or draw emotions you want to experience more often, more fully, or in a more healthy fashion; 4) third floor  a list of things that make you happy or want to feel happy about; 5) attic  list or draw what  a Life Helga Coates would look like. There is also a roof, where people and things that protect you can be listed.  The chimney provides an opportunity to list ways in which you blow off steam. The billboard allows you to post those things in your life that you are proud of and the walls of the house provide an opportunity to list those people and things that provide support. A sample DBT house with items was produced by the group together and posted on a wall. The patients were also provided copies of the DBT format that they can complete on their own during their free time on the unit or with their therapist on an outpatient basis. Impression of participation: The patient said preferred to be called \"Shannan,\" her nick-name for [place]. She said she felt over-stressed, anxious, and depressed with suicidal thinking before coming into the hospital. She denied any current SI/HI. There were no overt psychotic symptoms present in this group. She shared she felt overwhelmed by work and relationship difficulties. 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 This was the patient's first process group with the undersigned.

## 2017-04-03 NOTE — H&P
INITIAL PSYCHIATRIC EVALUATION         IDENTIFICATION:    Patient Name  Meenakshi Valerio   Date of Birth 1975   [facility] [id number]   Medical Record Number  [id number]      Age  46 y.o. PCP Addy Ying MD   Admit date:  4/2/2017    Room Number  729/01  @ [facility]   Date of Service  4/3/2017            HISTORY         REASON FOR HOSPITALIZATION:  CC: \"depression and anxiety\". Pt admitted under a voluntary basis for severe depression with suicidal ideations and no intent or plan posing an imminent danger to self and an inability to care for self. HISTORY OF PRESENT ILLNESS:    The patient, Meenakshi Valerio, is a 46 y.o. WHITE [place]  female with a past psychiatric history significant for bipolar d/o, who presents at this time with complaints of (and/or evidence of) the following emotional symptoms: depression. Additional symptomatology include anxiety. The above symptoms have been present for 2 days . These symptoms are of acute severity. These symptoms are intermittent/ fleeting in nature. The patient's condition has been precipitated by job change and psychosocial stressors (argument with   ). Patient's condition made worse by attending psychotherapy less frequently. UDS: negative; BAL=0. ALLERGIES:  Allergies   Allergen Reactions    Latex Itching    [place] Anaphylaxis    Clotrimazole Swelling    Lavender (Lavandula Angustifolia) Shortness of Breath      MEDICATIONS PRIOR TO ADMISSION:  Prescriptions Prior to Admission   Medication Sig    lamoTRIgine (LAMICTAL) 150 mg tablet Take 150 mg by mouth two (2) times a day. Indications: MOOD    citalopram (CELEXA) 40 mg tablet Take 40 mg by mouth daily. Indications: major depressive disorder    risperiDONE (RISPERDAL) 0.5 mg tablet Take 0.5 mg by mouth nightly. Indications: BIPOLAR DISORDER    fexofenadine-pseudoephedrine (ALLEGRA-D 24 HOUR) 180-240 mg per tablet Take 1 Tab by mouth daily.  Indications: ALLERGIC RHINITIS    naproxen sodium (ALEVE) 220 mg cap Take 1 Tab by mouth two (2) times daily as needed for Pain. Indications: Pain    docusate sodium (COLACE) 100 mg capsule Take 100 mg by mouth daily. Indications: Constipation    lactase (LACTAID) 3,000 unit tablet Take 1 Tab by mouth three (3) times daily as needed for Other (LACTOSE INTOLERANCE).  cholecalciferol, vitamin D3, 4,000 unit tab Take 4,000 Units by mouth daily. Indications: VITAMIN D DEFICIENCY      PAST MEDICAL HISTORY:  Past Medical History:   Diagnosis Date    ADHD (attention deficit hyperactivity disorder)     Bipolar 1 disorder ([facility] [address]    History reviewed. No pertinent surgical history. SOCIAL HISTORY:    Social History     Social History    Marital status: SINGLE     Spouse name: N/A    Number of children: N/A    Years of education: N/A     Occupational History    Not on file. Social History Main Topics    Smoking status: Never Smoker    Smokeless tobacco: Not on file    Alcohol use No    Drug use: No    Sexual activity: Not on file     Other Topics Concern    Not on file     Social History Narrative    46year old   female admitted voluntarily for depressed mood in the context of argument with . Greg has had to change jobs recently and a;so needs a psychotherapy provider. She seen Kristin Worrell N.P for medication management. Pt is a teacher. This is her first psychiatric admission. FAMILY HISTORY:    History reviewed. No pertinent family history. REVIEW OF SYSTEMS:   Psychological ROS: positive for - behavioral disorder  Respiratory ROS: no cough, shortness of breath, or wheezing  Cardiovascular ROS: no chest pain or dyspnea on exertion  Pertinent items are noted in the History of Present Illness. All other Systems reviewed and are considered negative.            MENTAL STATUS EXAM & VITALS         MENTAL STATUS EXAM (MSE):    MSE FINDINGS ARE WITHIN NORMAL LIMITS (WNL) UNLESS OTHERWISE STATED BELOW. ( ALL OF THE BELOW CATEGORIES OF THE MSE HAVE BEEN REVIEWED (reviewed 4/3/2017) AND UPDATED AS DEEMED APPROPRIATE )  General Presentation older than stated age and overweight, cooperative   Orientation oriented to time, place and person   Vital Signs  See below (reviewed 4/3/2017); Vital Signs (BP, Pulse, & Temp) are within normal limits if not listed below.    Gait and Station Stable/steady, no ataxia   Musculoskeletal System No extrapyramidal symptoms (EPS); no abnormal muscular movements or Tardive Dyskinesia (TD); muscle strength and tone are within normal limits   Language No aphasia or dysarthria   Speech:  normal pitch   Thought Processes logical; normal rate of thoughts; good abstract reasoning/computation   Thought Associations goal directed   Thought Content free of delusions   Suicidal Ideations none   Homicidal Ideations none   Mood:  stable    Affect:  mood-congruent   Memory recent  fair   Memory remote:  fair   Concentration/Attention:  fair   Fund of Knowledge average   Insight:  limited   Reliability fair   Judgment:  limited            VITALS:     Patient Vitals for the past 24 hrs:   Temp Pulse Resp BP SpO2   04/03/17 0710 98.2 °F (36.8 °C) 74 16 101/69 95 %   04/03/17 0045 98.2 °F (36.8 °C) 69 18 125/87 98 %   04/02/17 2016 98.3 °F (36.8 °C) 80 16 121/86 98 %     Wt Readings from Last 3 Encounters:   04/02/17 91.4 kg (201 lb 9.6 oz)     Temp Readings from Last 3 Encounters:   04/03/17 98.2 °F (36.8 °C)     BP Readings from Last 3 Encounters:   04/03/17 101/69     Pulse Readings from Last 3 Encounters:   04/03/17 74            DATA       LABORATORY DATA:  Labs Reviewed   METABOLIC PANEL, COMPREHENSIVE - Abnormal; Notable for the following:        Result Value    A-G Ratio 1.0 (*)     All other components within normal limits   ACETAMINOPHEN - Abnormal; Notable for the following:     ACETAMINOPHEN <2 (*)     All other components within normal limits   SALICYLATE - Abnormal; Notable for the following:     SALICYLATE <1.9 (*)     All other components within normal limits   URINALYSIS W/ RFLX MICROSCOPIC - Abnormal; Notable for the following:     Appearance CLOUDY (*)     Blood TRACE (*)     Epithelial cells MODERATE (*)     Bacteria 1+ (*)     All other components within normal limits   CBC WITH AUTOMATED DIFF   SAMPLES BEING HELD   ETHYL ALCOHOL   DRUG SCREEN, URINE   HCG URINE, QL   HCG URINE, QL. - POC     Admission on 04/02/2017   Component Date Value Ref Range Status    Pregnancy test,urine (POC) 04/02/2017 NEGATIVE   NEG   Final    WBC 04/02/2017 9.0  3.6 - 11.0 K/uL Final    RBC 04/02/2017 4.49  3.80 - 5.20 M/uL Final    HGB 04/02/2017 13.9  11.5 - 16.0 g/dL Final    HCT 04/02/2017 41.1  35.0 - 47.0 % Final    MCV 04/02/2017 91.5  80.0 - 99.0 FL Final    MCH 04/02/2017 31.0  26.0 - 34.0 PG Final    MCHC 04/02/2017 33.8  30.0 - 36.5 g/dL Final    RDW 04/02/2017 12.4  11.5 - 14.5 % Final    PLATELET 21/93/7775 658  150 - 400 K/uL Final    NEUTROPHILS 04/02/2017 65  32 - 75 % Final    LYMPHOCYTES 04/02/2017 27  12 - 49 % Final    MONOCYTES 04/02/2017 6  5 - 13 % Final    EOSINOPHILS 04/02/2017 2  0 - 7 % Final    BASOPHILS 04/02/2017 0  0 - 1 % Final    ABS. NEUTROPHILS 04/02/2017 5.9  1.8 - 8.0 K/UL Final    ABS. LYMPHOCYTES 04/02/2017 2.4  0.8 - 3.5 K/UL Final    ABS. MONOCYTES 04/02/2017 0.5  0.0 - 1.0 K/UL Final    ABS. EOSINOPHILS 04/02/2017 0.2  0.0 - 0.4 K/UL Final    ABS.  BASOPHILS 04/02/2017 0.0  0.0 - 0.1 K/UL Final    Sodium 04/02/2017 141  136 - 145 mmol/L Final    Potassium 04/02/2017 3.9  3.5 - 5.1 mmol/L Final    Chloride 04/02/2017 105  97 - 108 mmol/L Final    CO2 04/02/2017 28  21 - 32 mmol/L Final    Anion gap 04/02/2017 8  5 - 15 mmol/L Final    Glucose 04/02/2017 87  65 - 100 mg/dL Final    BUN 04/02/2017 15  6 - 20 MG/DL Final    Creatinine 04/02/2017 0.94  0.55 - 1.02 MG/DL Final    BUN/Creatinine ratio 04/02/2017 16  12 - 20   Final    GFR est AA 04/02/2017 >60  >60 ml/min/1.73m2 Final    GFR est non-AA 04/02/2017 >60  >60 ml/min/1.73m2 Final    Calcium 04/02/2017 9.4  8.5 - 10.1 MG/DL Final    Bilirubin, total 04/02/2017 0.3  0.2 - 1.0 MG/DL Final    ALT (SGPT) 04/02/2017 31  12 - 78 U/L Final    AST (SGOT) 04/02/2017 17  15 - 37 U/L Final    Alk.  phosphatase 04/02/2017 112  45 - 117 U/L Final    Protein, total 04/02/2017 7.9  6.4 - 8.2 g/dL Final    Albumin 04/02/2017 3.9  3.5 - 5.0 g/dL Final    Globulin 04/02/2017 4.0  2.0 - 4.0 g/dL Final    A-G Ratio 04/02/2017 1.0* 1.1 - 2.2   Final    ALCOHOL(ETHYL),SERUM 04/02/2017 <10  <10 MG/DL Final    ACETAMINOPHEN 04/02/2017 <2* 10 - 30 ug/mL Final    SALICYLATE 43/07/7548 <6.1* 2.8 - 20.0 MG/DL Final    AMPHETAMINE 04/02/2017 NEGATIVE   NEG   Final    BARBITURATES 04/02/2017 NEGATIVE   NEG   Final    BENZODIAZEPINE 04/02/2017 NEGATIVE   NEG   Final    COCAINE 04/02/2017 NEGATIVE   NEG   Final    METHADONE 04/02/2017 NEGATIVE   NEG   Final    OPIATES 04/02/2017 NEGATIVE   NEG   Final    PCP(PHENCYCLIDINE) 04/02/2017 NEGATIVE   NEG   Final    THC (TH-CANNABINOL) 04/02/2017 NEGATIVE   NEG   Final    Drug screen comment 04/02/2017 (NOTE)   Final    Color 04/02/2017 YELLOW/STRAW    Final    Appearance 04/02/2017 CLOUDY* CLEAR   Final    Specific gravity 04/02/2017 1.023  1.003 - 1.030   Final    pH (UA) 04/02/2017 5.0  5.0 - 8.0   Final    Protein 04/02/2017 NEGATIVE   NEG mg/dL Final    Glucose 04/02/2017 NEGATIVE   NEG mg/dL Final    Ketone 04/02/2017 NEGATIVE   NEG mg/dL Final    Bilirubin 04/02/2017 NEGATIVE   NEG   Final    Blood 04/02/2017 TRACE* NEG   Final    Urobilinogen 04/02/2017 0.2  0.2 - 1.0 EU/dL Final    Nitrites 04/02/2017 NEGATIVE   NEG   Final    Leukocyte Esterase 04/02/2017 NEGATIVE   NEG   Final    WBC 04/02/2017 0-4  0 - 4 /hpf Final    RBC 04/02/2017 0-5  0 - 5 /hpf Final    Epithelial cells 04/02/2017 MODERATE* FEW /lpf Final    Bacteria 04/02/2017 1+* NEG /hpf Final    Hyaline cast 04/02/2017 0-2  0 - 5 /lpf Final    HCG urine, Ql. 04/02/2017 NEGATIVE   NEG   Final        RADIOLOGY REPORTS:  No results found for this or any previous visit. No results found.            MEDICATIONS       ALL MEDICATIONS  Current Facility-Administered Medications   Medication Dose Route Frequency    ziprasidone (GEODON) 20 mg in sterile water (preservative free) 1 mL injection  20 mg IntraMUSCular BID PRN    OLANZapine (ZyPREXA) tablet 5 mg  5 mg Oral Q6H PRN    benztropine (COGENTIN) tablet 2 mg  2 mg Oral BID PRN    benztropine (COGENTIN) injection 2 mg  2 mg IntraMUSCular BID PRN    LORazepam (ATIVAN) injection 2 mg  2 mg IntraMUSCular Q4H PRN    LORazepam (ATIVAN) tablet 1 mg  1 mg Oral Q4H PRN    zolpidem (AMBIEN) tablet 5 mg  5 mg Oral QHS PRN    acetaminophen (TYLENOL) tablet 650 mg  650 mg Oral Q4H PRN    ibuprofen (MOTRIN) tablet 400 mg  400 mg Oral Q8H PRN    magnesium hydroxide (MILK OF MAGNESIA) 400 mg/5 mL oral suspension 30 mL  30 mL Oral DAILY PRN    nicotine (NICODERM CQ) 21 mg/24 hr patch 1 Patch  1 Patch TransDERmal DAILY PRN    docusate sodium (COLACE) capsule 100 mg  100 mg Oral DAILY    [START ON 4/4/2017] citalopram (CELEXA) tablet 40 mg  40 mg Oral DAILY    lamoTRIgine (LaMICtal) tablet 150 mg  150 mg Oral BID    risperiDONE (RisperDAL) tablet 0.5 mg  0.5 mg Oral QHS    risperiDONE (RisperDAL) tablet 0.5 mg  0.5 mg Oral BID      SCHEDULED MEDICATIONS  Current Facility-Administered Medications   Medication Dose Route Frequency    docusate sodium (COLACE) capsule 100 mg  100 mg Oral DAILY    [START ON 4/4/2017] citalopram (CELEXA) tablet 40 mg  40 mg Oral DAILY    lamoTRIgine (LaMICtal) tablet 150 mg  150 mg Oral BID    risperiDONE (RisperDAL) tablet 0.5 mg  0.5 mg Oral QHS    risperiDONE (RisperDAL) tablet 0.5 mg  0.5 mg Oral BID                ASSESSMENT & PLAN        The patient Sangita Rich is a 46 y.o.  female who presents at this time for treatment of the following diagnoses:  Patient Active Hospital Problem List:   Bipolar 1 disorder ([facility] [address] (4/3/2017)    Assessment: kerrie alternating with depression    Plan: Is on therapeutic dose of lamictal and 40 mg of Belexa. Will increase Risperdal          In summary, Zack Cannon presents with a severe exacerbation of the principal diagnosis, Bipolar 1 disorder [facility])    While on the unit Meenakshi Valerio will be provided with individual, milieu, occupational, group, and substance abuse therapies to address target symptoms as deemed appropriate for the individual patient. l continue to monitor blood levels (Depakote, Tegretol, lithium, clozapine---a drug with a narrow therapeutic index= NTI) and associated labs for drug therapy implemented that require intense monitoring for toxicity as deemed appropriate base on current medication side effects and pharmacodynamically determined drug 1/2 lives. I agree with decision to admit patient. I have spoken to [facility] psychiatric /ED staff regarding the nature of patients's admission at this time. A coordinated, multidisplinary treatment team (includes the nurse, unit pharmcist,  and writer) round was conducted for this initial evaluation with the patient present. The following regarding medications was addressed during rounds with patient:   the risks and benefits of the proposed medication. The patient was given the opportunity to ask questions. Informed consent given to the use of the above medications. I will continue to adjust psychiatric and non-psychiatric medications (see above \"medication\" section and orders section for details) as deemed appropriate & based upon diagnoses and response to treatment. I have reviewed admission (and previous/old) labs and medical tests in the EHR and or transferring hospital documents.  I will continue to order blood tests/labs and diagnostic tests as deemed appropriate and review results as they become available (see orders for details). I have reviewed old psychiatric and medical records available in the EHR. I Will order additional psychiatric records from other institutions to further elucidate the nature of patient's psychopathology and review once available. I will gather additional collateral information from friends, family and o/p treatment team to further elucidate the nature of patient's psychopathology and baselline level of psychiatric functioning.         ESTIMATED LENGTH OF STAY:   1-2 days       STRENGTHS:  Exercising self-direction/Resourceful, Access to housing/residential stability and Interpersonal/supportive relationships (family, friends, peers)                      SIGNED:    Sammy Streeter MD  4/3/2017

## 2017-04-03 NOTE — ROUTINE PROCESS
Report called to [facility]. Floor is ready to receive pt. Pt remains awake and alert with skin warm and dry. Respirations even and unlabored. Pt escorted to floor by this RN and police. Belongings up with pt.

## 2017-04-04 VITALS
HEIGHT: 65 IN | SYSTOLIC BLOOD PRESSURE: 108 MMHG | RESPIRATION RATE: 16 BRPM | TEMPERATURE: 98 F | HEART RATE: 93 BPM | WEIGHT: 201.6 LBS | BODY MASS INDEX: 33.59 KG/M2 | DIASTOLIC BLOOD PRESSURE: 69 MMHG | OXYGEN SATURATION: 96 %

## 2017-04-04 LAB
CHOLEST SERPL-MCNC: 162 MG/DL
GLUCOSE P FAST SERPL-MCNC: 100 MG/DL (ref 65–100)
HDLC SERPL-MCNC: 69 MG/DL
HDLC SERPL: 2.3 {RATIO} (ref 0–5)
LDLC SERPL CALC-MCNC: 80.4 MG/DL (ref 0–100)
LIPID PROFILE,FLP: NORMAL
TRIGL SERPL-MCNC: 63 MG/DL (ref ?–150)
TSH SERPL DL<=0.05 MIU/L-ACNC: 2.15 UIU/ML (ref 0.36–3.74)
VLDLC SERPL CALC-MCNC: 12.6 MG/DL

## 2017-04-04 PROCEDURE — 36415 COLL VENOUS BLD VENIPUNCTURE: CPT

## 2017-04-04 PROCEDURE — 74011250637 HC RX REV CODE- 250/637: Performed by: PSYCHIATRY & NEUROLOGY

## 2017-04-04 PROCEDURE — 82947 ASSAY GLUCOSE BLOOD QUANT: CPT

## 2017-04-04 PROCEDURE — 84443 ASSAY THYROID STIM HORMONE: CPT | Performed by: PSYCHIATRY & NEUROLOGY

## 2017-04-04 PROCEDURE — 74011250637 HC RX REV CODE- 250/637

## 2017-04-04 PROCEDURE — 80061 LIPID PANEL: CPT

## 2017-04-04 RX ORDER — RISPERIDONE 1 MG/1
1 TABLET, FILM COATED ORAL
Qty: 30 TAB | Refills: 0 | Status: SHIPPED | OUTPATIENT
Start: 2017-04-04

## 2017-04-04 RX ORDER — RISPERIDONE 1 MG/1
1 TABLET, FILM COATED ORAL
Status: DISCONTINUED | OUTPATIENT
Start: 2017-04-04 | End: 2017-04-04 | Stop reason: HOSPADM

## 2017-04-04 RX ADMIN — DOCUSATE SODIUM 100 MG: 100 CAPSULE, LIQUID FILLED ORAL at 08:48

## 2017-04-04 RX ADMIN — RISPERIDONE 0.5 MG: 0.5 TABLET, FILM COATED ORAL at 08:48

## 2017-04-04 RX ADMIN — CITALOPRAM HYDROBROMIDE 40 MG: 20 TABLET ORAL at 08:48

## 2017-04-04 RX ADMIN — LAMOTRIGINE 150 MG: 100 TABLET ORAL at 08:48

## 2017-04-04 NOTE — PROGRESS NOTES
Pharmacist Discharge Medication Reconciliation    Discharging Provider: Dr. Sheng Cervantes PMH:   Past Medical History:   Diagnosis Date    ADHD (attention deficit hyperactivity disorder)     Bipolar 1 disorder [facility])      Chief Complaint for this Admission:   Chief Complaint   Patient presents with    Suicidal     Allergies: Latex; [place]; Clotrimazole; and Lavender (Deonna Tovar)    Discharge Medications:   Current Discharge Medication List        CONTINUE these medications which have CHANGED    Details   risperiDONE (RISPERDAL) 1 mg tablet Take 1 Tab by mouth nightly. Indications: DEPRESSION ASSOCIATED WITH BIPOLAR DISORDER  Qty: 30 Tab, Refills: 0           CONTINUE these medications which have NOT CHANGED    Details   lamoTRIgine (LAMICTAL) 150 mg tablet Take 150 mg by mouth two (2) times a day. Indications: MOOD      citalopram (CELEXA) 40 mg tablet Take 40 mg by mouth daily. Indications: major depressive disorder      fexofenadine-pseudoephedrine (ALLEGRA-D 24 HOUR) 180-240 mg per tablet Take 1 Tab by mouth daily. Indications: ALLERGIC RHINITIS      naproxen sodium (ALEVE) 220 mg cap Take 1 Tab by mouth two (2) times daily as needed for Pain. Indications: Pain      docusate sodium (COLACE) 100 mg capsule Take 100 mg by mouth daily. Indications: Constipation      lactase (LACTAID) 3,000 unit tablet Take 1 Tab by mouth three (3) times daily as needed for Other (LACTOSE INTOLERANCE). cholecalciferol, vitamin D3, 4,000 unit tab Take 4,000 Units by mouth daily.  Indications: VITAMIN D DEFICIENCY             The patient's chart, MAR and AVS were reviewed by ISMAEL NegreteD.

## 2017-04-04 NOTE — BH NOTES
Patient discharged at  with belongings, prescription and discharge instructions. Patient was picked up by . Discharge instructions reviewed with patient and opportunity for questions given.

## 2017-04-04 NOTE — DISCHARGE SUMMARY
PSYCHIATRIC DISCHARGE SUMMARY         IDENTIFICATION:    Patient Name  Meenakshi Valerio   Date of Birth 1975   [facility] [id number]   Medical Record Number  [id number]      Age  46 y.o. PCP Addy Ying MD   Admit date:  4/2/2017    Discharge date: 4/4/2017   Room Number  729/01  @ [facility]   Date of Service  4/4/2017               TYPE OF DISCHARGE: REGULAR               CONDITION AT DISCHARGE: good and improved       PROVISIONAL & DISCHARGE DIAGNOSES:    Problem List  Date Reviewed: 4/3/2017          Codes Class    * (Principal)Bipolar 1 disorder (HCC) ICD-10-CM: F31.9  ICD-9-CM: 296.7               Active Hospital Problems    *Bipolar 1 disorder ([facility] [address]        DISCHARGE DIAGNOSIS:   Axis I:  SEE ABOVE  Axis II: SEE ABOVE  Axis III: SEE ABOVE  Axis IV:  lack of structure  Axis V:  70 on admission, 70 on discharge 70 (baseline)       CC & HISTORY OF PRESENT ILLNESS:46year old   female admitted voluntarily for a C/O worsening mood. Pt has a prescribing psychuiatrist and also a psychotherapist. Her mood decline was in response to marital stressors. The patient's resperdal was increased by 0.5 mg. This helped to improve her outlook, mood and sleep. At discharge she denied SI/HI intent and paln. SOCIAL HISTORY:    Social History     Social History    Marital status: SINGLE     Spouse name: N/A    Number of children: N/A    Years of education: N/A     Occupational History    Not on file. Social History Main Topics    Smoking status: Never Smoker    Smokeless tobacco: Not on file    Alcohol use No    Drug use: No    Sexual activity: Not on file     Other Topics Concern    Not on file     Social History Narrative    46year old   female admitted voluntarily for depressed mood in the context of argument with . Greg has had to change jobs recently and a;so needs a psychotherapy provider. She seen Sean Sandoval N.P for medication management.  Pt is a teacher. This is her first psychiatric admission. FAMILY HISTORY:   History reviewed. No pertinent family history. HOSPITALIZATION COURSE:    Meenakshi Valerio was admitted to the inpatient psychiatric unit [facility] for acute psychiatric stabilization in regards to symptomatology as described in the HPI above. The differential diagnosis at time of admission included: depression   While on the unit Meenakshi Valerio was involved in individual, group, occupational and milieu therapy. Psychiatric medications were adjusted during this hospitalization including [address] demonstrated a slow, but progressive improvement in overall condition. Much of patient's depression appeared to be related to situational stressors and psychological factors. Please see individual progress notes for more specific details regarding patient's hospitalization course. At time of dc, Meenakshi Valerio was without significant problems with depression psychosis  kerrie. Overall presentation at time of discharge is most consistent with the diagnosis of adjustment disorder with depressed mood. Patient with request for discharge today. There are no grounds to seek a TDO. Patient has maximized benefit to be derived from acute inpatient psychiatric treatment.   All members of the treatment team concur with each other in regards to plans for discharge today per patient's request.          LABS AND IMAGAING:    Labs Reviewed   METABOLIC PANEL, COMPREHENSIVE - Abnormal; Notable for the following:        Result Value    A-G Ratio 1.0 (*)     All other components within normal limits   ACETAMINOPHEN - Abnormal; Notable for the following:     ACETAMINOPHEN <2 (*)     All other components within normal limits   SALICYLATE - Abnormal; Notable for the following:     SALICYLATE <4.4 (*)     All other components within normal limits   URINALYSIS W/ RFLX MICROSCOPIC - Abnormal; Notable for the following:     Appearance CLOUDY (*)     Blood TRACE (*)     Epithelial cells MODERATE (*)     Bacteria 1+ (*)     All other components within normal limits   CBC WITH AUTOMATED DIFF   SAMPLES BEING HELD   ETHYL ALCOHOL   DRUG SCREEN, URINE   HCG URINE, QL   GLUCOSE, FASTING   LIPID PANEL   TSH 3RD GENERATION   HCG URINE, QL. - POC     Admission on 04/02/2017   Component Date Value Ref Range Status    Pregnancy test,urine (POC) 04/02/2017 NEGATIVE   NEG   Final    WBC 04/02/2017 9.0  3.6 - 11.0 K/uL Final    RBC 04/02/2017 4.49  3.80 - 5.20 M/uL Final    HGB 04/02/2017 13.9  11.5 - 16.0 g/dL Final    HCT 04/02/2017 41.1  35.0 - 47.0 % Final    MCV 04/02/2017 91.5  80.0 - 99.0 FL Final    MCH 04/02/2017 31.0  26.0 - 34.0 PG Final    MCHC 04/02/2017 33.8  30.0 - 36.5 g/dL Final    RDW 04/02/2017 12.4  11.5 - 14.5 % Final    PLATELET 45/10/4087 837  150 - 400 K/uL Final    NEUTROPHILS 04/02/2017 65  32 - 75 % Final    LYMPHOCYTES 04/02/2017 27  12 - 49 % Final    MONOCYTES 04/02/2017 6  5 - 13 % Final    EOSINOPHILS 04/02/2017 2  0 - 7 % Final    BASOPHILS 04/02/2017 0  0 - 1 % Final    ABS. NEUTROPHILS 04/02/2017 5.9  1.8 - 8.0 K/UL Final    ABS. LYMPHOCYTES 04/02/2017 2.4  0.8 - 3.5 K/UL Final    ABS. MONOCYTES 04/02/2017 0.5  0.0 - 1.0 K/UL Final    ABS. EOSINOPHILS 04/02/2017 0.2  0.0 - 0.4 K/UL Final    ABS.  BASOPHILS 04/02/2017 0.0  0.0 - 0.1 K/UL Final    Sodium 04/02/2017 141  136 - 145 mmol/L Final    Potassium 04/02/2017 3.9  3.5 - 5.1 mmol/L Final    Chloride 04/02/2017 105  97 - 108 mmol/L Final    CO2 04/02/2017 28  21 - 32 mmol/L Final    Anion gap 04/02/2017 8  5 - 15 mmol/L Final    Glucose 04/02/2017 87  65 - 100 mg/dL Final    BUN 04/02/2017 15  6 - 20 MG/DL Final    Creatinine 04/02/2017 0.94  0.55 - 1.02 MG/DL Final    BUN/Creatinine ratio 04/02/2017 16  12 - 20   Final    GFR est AA 04/02/2017 >60  >60 ml/min/1.73m2 Final    GFR est non-AA 04/02/2017 >60  >60 ml/min/1.73m2 Final    Calcium 04/02/2017 9.4  8.5 - 10.1 MG/DL Final    Bilirubin, total 04/02/2017 0.3  0.2 - 1.0 MG/DL Final    ALT (SGPT) 04/02/2017 31  12 - 78 U/L Final    AST (SGOT) 04/02/2017 17  15 - 37 U/L Final    Alk.  phosphatase 04/02/2017 112  45 - 117 U/L Final    Protein, total 04/02/2017 7.9  6.4 - 8.2 g/dL Final    Albumin 04/02/2017 3.9  3.5 - 5.0 g/dL Final    Globulin 04/02/2017 4.0  2.0 - 4.0 g/dL Final    A-G Ratio 04/02/2017 1.0* 1.1 - 2.2   Final    ALCOHOL(ETHYL),SERUM 04/02/2017 <10  <10 MG/DL Final    ACETAMINOPHEN 04/02/2017 <2* 10 - 30 ug/mL Final    SALICYLATE 93/40/1414 <4.0* 2.8 - 20.0 MG/DL Final    AMPHETAMINE 04/02/2017 NEGATIVE   NEG   Final    BARBITURATES 04/02/2017 NEGATIVE   NEG   Final    BENZODIAZEPINE 04/02/2017 NEGATIVE   NEG   Final    COCAINE 04/02/2017 NEGATIVE   NEG   Final    METHADONE 04/02/2017 NEGATIVE   NEG   Final    OPIATES 04/02/2017 NEGATIVE   NEG   Final    PCP(PHENCYCLIDINE) 04/02/2017 NEGATIVE   NEG   Final    THC (TH-CANNABINOL) 04/02/2017 NEGATIVE   NEG   Final    Drug screen comment 04/02/2017 (NOTE)   Final    Color 04/02/2017 YELLOW/STRAW    Final    Appearance 04/02/2017 CLOUDY* CLEAR   Final    Specific gravity 04/02/2017 1.023  1.003 - 1.030   Final    pH (UA) 04/02/2017 5.0  5.0 - 8.0   Final    Protein 04/02/2017 NEGATIVE   NEG mg/dL Final    Glucose 04/02/2017 NEGATIVE   NEG mg/dL Final    Ketone 04/02/2017 NEGATIVE   NEG mg/dL Final    Bilirubin 04/02/2017 NEGATIVE   NEG   Final    Blood 04/02/2017 TRACE* NEG   Final    Urobilinogen 04/02/2017 0.2  0.2 - 1.0 EU/dL Final    Nitrites 04/02/2017 NEGATIVE   NEG   Final    Leukocyte Esterase 04/02/2017 NEGATIVE   NEG   Final    WBC 04/02/2017 0-4  0 - 4 /hpf Final    RBC 04/02/2017 0-5  0 - 5 /hpf Final    Epithelial cells 04/02/2017 MODERATE* FEW /lpf Final    Bacteria 04/02/2017 1+* NEG /hpf Final    Hyaline cast 04/02/2017 0-2  0 - 5 /lpf Final    HCG urine, Ql. 04/02/2017 NEGATIVE   NEG   Final    Glucose 04/04/2017 100  65 - 100 MG/DL Final    LIPID PROFILE 04/04/2017        Final    Cholesterol, total 04/04/2017 162  <200 MG/DL Final    Triglyceride 04/04/2017 63  <150 MG/DL Final    HDL Cholesterol 04/04/2017 69  MG/DL Final    LDL, calculated 04/04/2017 80.4  0 - 100 MG/DL Final    VLDL, calculated 04/04/2017 12.6  MG/DL Final    CHOL/HDL Ratio 04/04/2017 2.3  0 - 5.0   Final    TSH 04/04/2017 2.15  0.36 - 3.74 uIU/mL Final     No results found. DISPOSITION:    Home. Patient to f/u with o/p psychiatric, and psychotherapy appointments. Patient is to f/u with internist as directed. FOLLOW-UP CARE:    Activity as tolerated  Regular Diet  Wound Care: none needed. Follow-up Information     Follow up With Details Comments Contact Info    Phys Other, MD   Patient can only remember the practice name and not the physician                   PROGNOSIS:  Greatly dependent upon patient's ability to f/u with o/p psychiatric/psychotherapy appointments as well as to comply with psychiatric medications as prescribed. Patient denies suicidal or homicidal ideations. [address] Valerio fully contracts for safety. Patient reports many positive predictive factors in terms of not attempting suicide or homicide. Patient appears to be at low risk of suicide or homicide. Patient and family are aware and in agreement with discharge and discharge plan. DISCHARGE MEDICATIONS: (no changes made). Informed consent given for the use of following psychotropic medications:  Current Discharge Medication List      CONTINUE these medications which have CHANGED    Details   risperiDONE (RISPERDAL) 1 mg tablet Take 1 Tab by mouth nightly.  Indications: DEPRESSION ASSOCIATED WITH BIPOLAR DISORDER  Qty: 30 Tab, Refills: 0         CONTINUE these medications which have NOT CHANGED    Details   lamoTRIgine (LAMICTAL) 150 mg tablet Take 150 mg by mouth two (2) times a day. Indications: MOOD      citalopram (CELEXA) 40 mg tablet Take 40 mg by mouth daily. Indications: major depressive disorder      fexofenadine-pseudoephedrine (ALLEGRA-D 24 HOUR) 180-240 mg per tablet Take 1 Tab by mouth daily. Indications: ALLERGIC RHINITIS      naproxen sodium (ALEVE) 220 mg cap Take 1 Tab by mouth two (2) times daily as needed for Pain. Indications: Pain      docusate sodium (COLACE) 100 mg capsule Take 100 mg by mouth daily. Indications: Constipation      lactase (LACTAID) 3,000 unit tablet Take 1 Tab by mouth three (3) times daily as needed for Other (LACTOSE INTOLERANCE). cholecalciferol, vitamin D3, 4,000 unit tab Take 4,000 Units by mouth daily. Indications: VITAMIN D DEFICIENCY                    A coordinated, multidisplinary treatment team round was conducted with Doc Rincon---this is done daily here at [facility] . This team consists of the nurse, psychiatric unit pharmcist,  and writer. I have spent greater than 35 minutes on discharge work.     Signed:  Salome Fried MD  4/4/2017

## 2017-04-04 NOTE — PROGRESS NOTES
Problem: Depressed Mood (Adult/Pediatric)  Goal: *STG: Remains safe in hospital  Outcome: Progressing Towards Goal  Received in bed asleep. Continues to be safe this hospitalization. Q 15 minute checks maintained for safety. 0630 - Pt was compliant with lab draw this am. Tsh was ordered as an add-on.

## 2017-04-04 NOTE — INTERDISCIPLINARY ROUNDS
Behavioral Health Interdisciplinary Rounds     Patient Name: Sangita Rich  Age: 46 y.o.   Room/Bed:  [facility]/  Primary Diagnosis: Bipolar 1 disorder (HCC)   Admission Status: Voluntary     Readmission within 30 days: no  Power of  in place: no  Patient requires a blocked bed: no          Reason for blocked bed: n/a    VTE Prophylaxis: Not indicated  Mobility needs/Fall risk: no    Nutritional Plan: no  Consults:          Labs/Testing due today?: yes    Sleep hours:        Participation in Care/Groups:    Medication Compliant?: Yes  PRNS (last 24 hours): None    Restraints (last 24 hours):  no  Substance Abuse:  no  CIWA (range last 24 hours):  COWS (range last 24 hours):   Alcohol screening (AUDIT) completed -  AUDIT Score: 2  If applicable, date SBIRT discussed in treatment team AND documented: N/A  Tobacco - patient is a smoker: no   Date tobacco education completed by RN: N/A  24 hour chart check complete: yes     Patient goal(s) for today: Discharge  Treatment team focus/goals: Schedule follow-up; discharge  LOS:  1  Expected LOS: 1  Financial concerns/prescription coverage: [employer]  Date of last family contact: None      Family requesting physician contact today: No  Discharge plan: Return home       Outpatient provider(s): Jose Rios     Participating treatment team members: Sangita Hilario, NAT Medrano; Dr. Jairon Haley MD; Rk Hunter RN; Kendall Rodriguez, CristiD

## 2017-04-04 NOTE — BH NOTES
Behavioral Health Transition Record to Provider    Patient Name: Jeremi De Leon  YOB: 1975  Medical Record Number: [id number]  Date of Admission: 4/2/2017  Date of Discharge: 4/4/2017    Attending Provider: Lucero Lamar MD  Discharging Provider: Lucero Lamar MD  To contact this individual call [phone] and ask the  to page. If unavailable, ask to be transferred to [facility] Provider on call. [facility] Provider will be available on call 24/7 and during holidays     Primary Care Provider: Addy Ying MD    Allergies   Allergen Reactions    Latex Itching    [place] Anaphylaxis    Clotrimazole Swelling    Lavender (Lavandula Angustifolia) Shortness of Breath          H&P Summary Notes      H&P by Lucero Lamar MD at 04/03/17 82905 Impulsiv     Author:  Lucero Lamar MD Service:  PSYCHIATRY Author Type:  Physician    Filed:  04/03/17 1430 Date of Service:  04/03/17 1313 Status:  Signed    :  Lucero Lamar MD (Physician)             INITIAL PSYCHIATRIC EVALUATION         IDENTIFICATION:    Patient Name[MH1.1]  Dhruv Rincon[1.2]   Date of Birth[MH1.1] 1975[MH1.2]   [facility][MH1.1] [id number][MH1.2]   Medical Record Number[MH1.1]  [id number][CY3.4]      Age[MH1.1]  46 y. o.[MH1.2]   PCP[MH1.1] Addy Ying MD[MH1.2]   Admit date:[MH1.1]  4/2/2017[MH1.2]    Room Number[1.1]  729/01[1.2]  @[1.1] [facility]1.2]   Date of Service[MH1.1]  4/3/2017[MH1.2]            HISTORY         REASON FOR HOSPITALIZATION:  CC: \"depression and anxiety\". Pt admitted under a voluntary basis for severe depression with suicidal ideations and no intent or plan posing an imminent danger to self and an inability to care for self. HISTORY OF PRESENT ILLNESS:    The patient,[MH1.1] Zack Arriaga[MH1.2], is a[MH1.1] 46 y.o.   WHITE OR  female[MH1.2] with a past psychiatric history significant for[MH1.1] bipolar d/o[MH1.3], who presents at this time with complaints of (and/or evidence of) the following emotional symptoms:[MH1.1] depression[MH1.3]. Additional symptomatology include[MH1.1] anxiety[MH1.3]. The above symptoms have been present for[MH1.1] 2 days[MH1.3] . These symptoms are of[MH1.1] acute[MH1.3] severity. These symptoms are intermittent/ fleeting in nature. The patient's condition has been precipitated by[MH1.1] job change[MH1.3] and psychosocial stressors ([MH1.1]argument with [MH1.3]  ). Patient's condition made worse by[MH1.1] attending psychotherapy less frequently[MH1.3]. UDS: negative; BAL=0. ALLERGIES:[MH1.1]  Allergies   Allergen Reactions    Latex Itching    [place] Anaphylaxis    Clotrimazole Swelling    Lavender (Lavandula Angustifolia) Shortness of Breath[MH1.2]      MEDICATIONS PRIOR TO ADMISSION:[MH1.1]  Prescriptions Prior to Admission   Medication Sig    lamoTRIgine (LAMICTAL) 150 mg tablet Take 150 mg by mouth two (2) times a day. Indications: MOOD    citalopram (CELEXA) 40 mg tablet Take 40 mg by mouth daily. Indications: major depressive disorder    risperiDONE (RISPERDAL) 0.5 mg tablet Take 0.5 mg by mouth nightly. Indications: BIPOLAR DISORDER    fexofenadine-pseudoephedrine (ALLEGRA-D 24 HOUR) 180-240 mg per tablet Take 1 Tab by mouth daily. Indications: ALLERGIC RHINITIS    naproxen sodium (ALEVE) 220 mg cap Take 1 Tab by mouth two (2) times daily as needed for Pain. Indications: Pain    docusate sodium (COLACE) 100 mg capsule Take 100 mg by mouth daily. Indications: Constipation    lactase (LACTAID) 3,000 unit tablet Take 1 Tab by mouth three (3) times daily as needed for Other (LACTOSE INTOLERANCE).  cholecalciferol, vitamin D3, 4,000 unit tab Take 4,000 Units by mouth daily.  Indications: VITAMIN D DEFICIENCY[MH1.2]      PAST MEDICAL HISTORY:[MH1.1]  Past Medical History:   Diagnosis Date    ADHD (attention deficit hyperactivity disorder)     [facility]    History reviewed. No pertinent surgical history. [MH1.2]   SOCIAL HISTORY:[MH1.1]    Social History     Social History    Marital status: SINGLE     Spouse name: N/A    Number of children: N/A    Years of education: N/A     Occupational History    Not on file. Social History Main Topics    Smoking status: Never Smoker    Smokeless tobacco: Not on file    Alcohol use No    Drug use: No    Sexual activity: Not on file     Other Topics Concern    Not on file     Social History Narrative    46year old   female admitted voluntarily for depressed mood in the context of argument with . Greg has had to change jobs recently and a;so needs a psychotherapy provider. She seen Chandra Grijalva N.P for medication management. Pt is a teacher. This is her first psychiatric admission.[MH1.2]      FAMILY HISTORY:[MH1.1]    History reviewed. No pertinent family history. [MH1.2]    REVIEW OF SYSTEMS:[MH1.1]   Psychological ROS: positive for - behavioral disorder  Respiratory ROS: no cough, shortness of breath, or wheezing  Cardiovascular ROS: no chest pain or dyspnea on exertion[MH1.3]  Pertinent items are noted in the History of Present Illness. All other Systems reviewed and are considered negative. MENTAL STATUS EXAM & VITALS         MENTAL STATUS EXAM (MSE):    MSE FINDINGS ARE WITHIN NORMAL LIMITS (WNL) UNLESS OTHERWISE STATED BELOW. ( ALL OF THE BELOW CATEGORIES OF THE MSE HAVE BEEN REVIEWED (reviewed[MH1.1] 4/3/2017[MH1.2]) AND UPDATED AS DEEMED APPROPRIATE )  General Presentation[MH1.1] older than stated age and overweight[MH1.3],[MH1.1] cooperative[MH1.3]   Orientation[MH1.1] oriented to time, place and person[MH1.3]   Vital Signs  See below (reviewed[MH1.1] 4/3/2017[MH1.2]); Vital Signs (BP, Pulse, & Temp) are within normal limits if not listed below.    Gait and Station Stable/steady, no ataxia   Musculoskeletal System No extrapyramidal symptoms (EPS); no abnormal muscular movements or Tardive Dyskinesia (TD); muscle strength and tone are within normal limits   Language No aphasia or dysarthria   Speech:[MH1.1]  normal pitch[MH1.3]   Thought Processes logical;[MH1.1] normal rate of thoughts[MH1.3]; [MH1.1] good abstract reasoning/computation[MH1.3]   Thought Associations[MH1.1] goal directed[MH1.3]   Thought Content[MH1.1] free of delusions[MH1.3]   Suicidal Ideations[MH1.1] none[MH1.3]   Homicidal Ideations[MH1.1] none[MH1.3]   Mood:[MH1.1]  stable[MH1.3]    Affect:[MH1.1]  mood-congruent[MH1.3]   Memory recent[MH1.1]  fair[MH1.3]   Memory remote:[MH1.1]  fair[MH1.3]   Concentration/Attention:[MH1.1]  fair[MH1.3]   Fund of Knowledge[MH1.1] average[MH1.3]   Insight:[MH1.1]  limited[MH1.3]   Reliability[MH1.1] fair[MH1.3]   Judgment:[MH1.1]  limited[MH1.3]            VITALS:[MH1.1]     Patient Vitals for the past 24 hrs:   Temp Pulse Resp BP SpO2   04/03/17 0710 98.2 °F (36.8 °C) 74 16 101/69 95 %   04/03/17 0045 98.2 °F (36.8 °C) 69 18 125/87 98 %   04/02/17 2016 98.3 °F (36.8 °C) 80 16 121/86 98 %     Wt Readings from Last 3 Encounters:   04/02/17 91.4 kg (201 lb 9.6 oz)     Temp Readings from Last 3 Encounters:   04/03/17 98.2 °F (36.8 °C)     BP Readings from Last 3 Encounters:   04/03/17 101/69     Pulse Readings from Last 3 Encounters:   04/03/17 74[MH1.2]            DATA       LABORATORY DATA:[MH1.1]  Labs Reviewed   METABOLIC PANEL, COMPREHENSIVE - Abnormal; Notable for the following:        Result Value    A-G Ratio 1.0 (*)     All other components within normal limits   ACETAMINOPHEN - Abnormal; Notable for the following:     ACETAMINOPHEN <2 (*)     All other components within normal limits   SALICYLATE - Abnormal; Notable for the following:     SALICYLATE <3.6 (*)     All other components within normal limits   URINALYSIS W/ RFLX MICROSCOPIC - Abnormal; Notable for the following:     Appearance CLOUDY (*)     Blood TRACE (*)     Epithelial cells MODERATE (*)     Bacteria 1+ (*)     All other components within normal limits   CBC WITH AUTOMATED DIFF   SAMPLES BEING HELD   ETHYL ALCOHOL   DRUG SCREEN, URINE   HCG URINE, QL   HCG URINE, QL. - POC     Admission on 04/02/2017   Component Date Value Ref Range Status    Pregnancy test,urine (POC) 04/02/2017 NEGATIVE   NEG   Final    WBC 04/02/2017 9.0  3.6 - 11.0 K/uL Final    RBC 04/02/2017 4.49  3.80 - 5.20 M/uL Final    HGB 04/02/2017 13.9  11.5 - 16.0 g/dL Final    HCT 04/02/2017 41.1  35.0 - 47.0 % Final    MCV 04/02/2017 91.5  80.0 - 99.0 FL Final    MCH 04/02/2017 31.0  26.0 - 34.0 PG Final    MCHC 04/02/2017 33.8  30.0 - 36.5 g/dL Final    RDW 04/02/2017 12.4  11.5 - 14.5 % Final    PLATELET 59/31/8448 812  150 - 400 K/uL Final    NEUTROPHILS 04/02/2017 65  32 - 75 % Final    LYMPHOCYTES 04/02/2017 27  12 - 49 % Final    MONOCYTES 04/02/2017 6  5 - 13 % Final    EOSINOPHILS 04/02/2017 2  0 - 7 % Final    BASOPHILS 04/02/2017 0  0 - 1 % Final    ABS. NEUTROPHILS 04/02/2017 5.9  1.8 - 8.0 K/UL Final    ABS. LYMPHOCYTES 04/02/2017 2.4  0.8 - 3.5 K/UL Final    ABS. MONOCYTES 04/02/2017 0.5  0.0 - 1.0 K/UL Final    ABS. EOSINOPHILS 04/02/2017 0.2  0.0 - 0.4 K/UL Final    ABS.  BASOPHILS 04/02/2017 0.0  0.0 - 0.1 K/UL Final    Sodium 04/02/2017 141  136 - 145 mmol/L Final    Potassium 04/02/2017 3.9  3.5 - 5.1 mmol/L Final    Chloride 04/02/2017 105  97 - 108 mmol/L Final    CO2 04/02/2017 28  21 - 32 mmol/L Final    Anion gap 04/02/2017 8  5 - 15 mmol/L Final    Glucose 04/02/2017 87  65 - 100 mg/dL Final    BUN 04/02/2017 15  6 - 20 MG/DL Final    Creatinine 04/02/2017 0.94  0.55 - 1.02 MG/DL Final    BUN/Creatinine ratio 04/02/2017 16  12 - 20   Final    GFR est AA 04/02/2017 >60  >60 ml/min/1.73m2 Final    GFR est non-AA 04/02/2017 >60  >60 ml/min/1.73m2 Final    Calcium 04/02/2017 9.4  8.5 - 10.1 MG/DL Final    Bilirubin, total 04/02/2017 0.3  0.2 - 1.0 MG/DL Final    ALT (SGPT) 04/02/2017 31  12 - 78 U/L Final    AST (SGOT) 04/02/2017 17  15 - 37 U/L Final    Alk. phosphatase 04/02/2017 112  45 - 117 U/L Final    Protein, total 04/02/2017 7.9  6.4 - 8.2 g/dL Final    Albumin 04/02/2017 3.9  3.5 - 5.0 g/dL Final    Globulin 04/02/2017 4.0  2.0 - 4.0 g/dL Final    A-G Ratio 04/02/2017 1.0* 1.1 - 2.2   Final    ALCOHOL(ETHYL),SERUM 04/02/2017 <10  <10 MG/DL Final    ACETAMINOPHEN 04/02/2017 <2* 10 - 30 ug/mL Final    SALICYLATE 33/57/4117 <7.4* 2.8 - 20.0 MG/DL Final    AMPHETAMINE 04/02/2017 NEGATIVE   NEG   Final    BARBITURATES 04/02/2017 NEGATIVE   NEG   Final    BENZODIAZEPINE 04/02/2017 NEGATIVE   NEG   Final    COCAINE 04/02/2017 NEGATIVE   NEG   Final    METHADONE 04/02/2017 NEGATIVE   NEG   Final    OPIATES 04/02/2017 NEGATIVE   NEG   Final    PCP(PHENCYCLIDINE) 04/02/2017 NEGATIVE   NEG   Final    THC (TH-CANNABINOL) 04/02/2017 NEGATIVE   NEG   Final    Drug screen comment 04/02/2017 (NOTE)   Final    Color 04/02/2017 YELLOW/STRAW    Final    Appearance 04/02/2017 CLOUDY* CLEAR   Final    Specific gravity 04/02/2017 1.023  1.003 - 1.030   Final    pH (UA) 04/02/2017 5.0  5.0 - 8.0   Final    Protein 04/02/2017 NEGATIVE   NEG mg/dL Final    Glucose 04/02/2017 NEGATIVE   NEG mg/dL Final    Ketone 04/02/2017 NEGATIVE   NEG mg/dL Final    Bilirubin 04/02/2017 NEGATIVE   NEG   Final    Blood 04/02/2017 TRACE* NEG   Final    Urobilinogen 04/02/2017 0.2  0.2 - 1.0 EU/dL Final    Nitrites 04/02/2017 NEGATIVE   NEG   Final    Leukocyte Esterase 04/02/2017 NEGATIVE   NEG   Final    WBC 04/02/2017 0-4  0 - 4 /hpf Final    RBC 04/02/2017 0-5  0 - 5 /hpf Final    Epithelial cells 04/02/2017 MODERATE* FEW /lpf Final    Bacteria 04/02/2017 1+* NEG /hpf Final    Hyaline cast 04/02/2017 0-2  0 - 5 /lpf Final    HCG urine, Ql. 04/02/2017 NEGATIVE   NEG   Final[MH1.2]        RADIOLOGY REPORTS:[MH1.1]  No results found for this or any previous visit. [MH1. 2]No results found.            MEDICATIONS ALL MEDICATIONS[MH1.1]  Current Facility-Administered Medications   Medication Dose Route Frequency    ziprasidone (GEODON) 20 mg in sterile water (preservative free) 1 mL injection  20 mg IntraMUSCular BID PRN    OLANZapine (ZyPREXA) tablet 5 mg  5 mg Oral Q6H PRN    benztropine (COGENTIN) tablet 2 mg  2 mg Oral BID PRN    benztropine (COGENTIN) injection 2 mg  2 mg IntraMUSCular BID PRN    LORazepam (ATIVAN) injection 2 mg  2 mg IntraMUSCular Q4H PRN    LORazepam (ATIVAN) tablet 1 mg  1 mg Oral Q4H PRN    zolpidem (AMBIEN) tablet 5 mg  5 mg Oral QHS PRN    acetaminophen (TYLENOL) tablet 650 mg  650 mg Oral Q4H PRN    ibuprofen (MOTRIN) tablet 400 mg  400 mg Oral Q8H PRN    magnesium hydroxide (MILK OF MAGNESIA) 400 mg/5 mL oral suspension 30 mL  30 mL Oral DAILY PRN    nicotine (NICODERM CQ) 21 mg/24 hr patch 1 Patch  1 Patch TransDERmal DAILY PRN    docusate sodium (COLACE) capsule 100 mg  100 mg Oral DAILY    [START ON 4/4/2017] citalopram (CELEXA) tablet 40 mg  40 mg Oral DAILY    lamoTRIgine (LaMICtal) tablet 150 mg  150 mg Oral BID    risperiDONE (RisperDAL) tablet 0.5 mg  0.5 mg Oral QHS    risperiDONE (RisperDAL) tablet 0.5 mg  0.5 mg Oral BID[MH1.2]      SCHEDULED MEDICATIONS[MH1.1]  Current Facility-Administered Medications   Medication Dose Route Frequency    docusate sodium (COLACE) capsule 100 mg  100 mg Oral DAILY    [START ON 4/4/2017] citalopram (CELEXA) tablet 40 mg  40 mg Oral DAILY    lamoTRIgine (LaMICtal) tablet 150 mg  150 mg Oral BID    risperiDONE (RisperDAL) tablet 0.5 mg  0.5 mg Oral QHS    risperiDONE (RisperDAL) tablet 0.5 mg  0.5 mg Oral BID[MH1.2]                ASSESSMENT & PLAN        The patient[MH1.1] Zack Arriaga[MH1.2] is a[MH1.1] 46 y.o.  female[MH1.2] who presents at this time for treatment of the following diagnoses:  Patient Active Hospital Problem List:   Bipolar 1 disorder ([facility] (4/3/2017)    Assessment:[MH1.1] kerrie alternating with depression[MH1.3]    Plan:[MH1.1] Is on therapeutic dose of lamictal and 40 mg of Belexa. Will increase Risperdal[MH1.3]          In summary,[MH1.1] Zack Arriaga[MH1.2] presents with a severe exacerbation of the principal diagnosis,[MH1.1] Bipolar 1 disorder (HCC)[MH1.2]    While on the unit[MH1.1] Doc Rincon[MH1.2] will be provided with individual, milieu, occupational, group, and substance abuse therapies to address target symptoms as deemed appropriate for the individual patient. l continue to monitor blood levels (Depakote, Tegretol, lithium, clozapine---a drug with a narrow therapeutic index= NTI) and associated labs for drug therapy implemented that require intense monitoring for toxicity as deemed appropriate base on current medication side effects and pharmacodynamically determined drug 1/2 lives. I agree with decision to admit patient. I have spoken to [facility] psychiatric /ED staff regarding the nature of patients's admission at this time. A coordinated, multidisplinary treatment team (includes the nurse, unit pharmcist,  and writer) round was conducted for this initial evaluation with the patient present. The following regarding medications was addressed during rounds with patient:   the risks and benefits of the proposed medication. The patient was given the opportunity to ask questions. Informed consent given to the use of the above medications. I will continue to adjust psychiatric and non-psychiatric medications (see above \"medication\" section and orders section for details) as deemed appropriate & based upon diagnoses and response to treatment. I have reviewed admission (and previous/old) labs and medical tests in the EHR and or transferring hospital documents. I will continue to order blood tests/labs and diagnostic tests as deemed appropriate and review results as they become available (see orders for details).     I have reviewed old psychiatric and medical records available in the EHR. I Will order additional psychiatric records from other institutions to further elucidate the nature of patient's psychopathology and review once available. I will gather additional collateral information from friends, family and o/p treatment team to further elucidate the nature of patient's psychopathology and baselline level of psychiatric functioning. ESTIMATED LENGTH OF STAY:[MH1.1]   1-2[MH1.3] days       STRENGTHS:[MH1.1]  Exercising self-direction/Resourceful, Access to housing/residential stability and Interpersonal/supportive relationships (family, friends, peers)[MH1.3]                      SIGNED:[MH1.1]    Carlos Rodríguez MD  4/3/2017[MH1.2]                  Revision History       User Key Date/Time User Provider Type Action    > MH1.3 04/03/17 1430 Carlos Rodríguez MD Physician Sign     MH1.2 04/03/17 1314 Carlos Rodríguez MD Physician      MH1.1 04/03/17 JERONIMO Patterson MD Physician               [facility]: [facility]    * No surgery found *    Results for orders placed or performed during the hospital encounter of 04/02/17   CBC WITH AUTOMATED DIFF   Result Value Ref Range    WBC 9.0 3.6 - 11.0 K/uL    RBC 4.49 3.80 - 5.20 M/uL    HGB 13.9 11.5 - 16.0 g/dL    HCT 41.1 35.0 - 47.0 %    MCV 91.5 80.0 - 99.0 FL    MCH 31.0 26.0 - 34.0 PG    MCHC 33.8 30.0 - 36.5 g/dL    RDW 12.4 11.5 - 14.5 %    PLATELET 766 263 - 989 K/uL    NEUTROPHILS 65 32 - 75 %    LYMPHOCYTES 27 12 - 49 %    MONOCYTES 6 5 - 13 %    EOSINOPHILS 2 0 - 7 %    BASOPHILS 0 0 - 1 %    ABS. NEUTROPHILS 5.9 1.8 - 8.0 K/UL    ABS. LYMPHOCYTES 2.4 0.8 - 3.5 K/UL    ABS. MONOCYTES 0.5 0.0 - 1.0 K/UL    ABS. EOSINOPHILS 0.2 0.0 - 0.4 K/UL    ABS.  BASOPHILS 0.0 0.0 - 0.1 K/UL   METABOLIC PANEL, COMPREHENSIVE   Result Value Ref Range    Sodium 141 136 - 145 mmol/L    Potassium 3.9 3.5 - 5.1 mmol/L    Chloride 105 97 - 108 mmol/L    CO2 28 21 - 32 mmol/L Anion gap 8 5 - 15 mmol/L    Glucose 87 65 - 100 mg/dL    BUN 15 6 - 20 MG/DL    Creatinine 0.94 0.55 - 1.02 MG/DL    BUN/Creatinine ratio 16 12 - 20      GFR est AA >60 >60 ml/min/1.73m2    GFR est non-AA >60 >60 ml/min/1.73m2    Calcium 9.4 8.5 - 10.1 MG/DL    Bilirubin, total 0.3 0.2 - 1.0 MG/DL    ALT (SGPT) 31 12 - 78 U/L    AST (SGOT) 17 15 - 37 U/L    Alk.  phosphatase 112 45 - 117 U/L    Protein, total 7.9 6.4 - 8.2 g/dL    Albumin 3.9 3.5 - 5.0 g/dL    Globulin 4.0 2.0 - 4.0 g/dL    A-G Ratio 1.0 (L) 1.1 - 2.2     ETHYL ALCOHOL   Result Value Ref Range    ALCOHOL(ETHYL),SERUM <10 <10 MG/DL   ACETAMINOPHEN   Result Value Ref Range    ACETAMINOPHEN <2 (L) 10 - 30 ug/mL   SALICYLATE   Result Value Ref Range    SALICYLATE <0.0 (L) 2.8 - 20.0 MG/DL   DRUG SCREEN, URINE   Result Value Ref Range    AMPHETAMINE NEGATIVE  NEG      BARBITURATES NEGATIVE  NEG      BENZODIAZEPINE NEGATIVE  NEG      COCAINE NEGATIVE  NEG      METHADONE NEGATIVE  NEG      OPIATES NEGATIVE  NEG      PCP(PHENCYCLIDINE) NEGATIVE  NEG      THC (TH-CANNABINOL) NEGATIVE  NEG      Drug screen comment (NOTE)    URINALYSIS W/ RFLX MICROSCOPIC   Result Value Ref Range    Color YELLOW/STRAW      Appearance CLOUDY (A) CLEAR      Specific gravity 1.023 1.003 - 1.030      pH (UA) 5.0 5.0 - 8.0      Protein NEGATIVE  NEG mg/dL    Glucose NEGATIVE  NEG mg/dL    Ketone NEGATIVE  NEG mg/dL    Bilirubin NEGATIVE  NEG      Blood TRACE (A) NEG      Urobilinogen 0.2 0.2 - 1.0 EU/dL    Nitrites NEGATIVE  NEG      Leukocyte Esterase NEGATIVE  NEG      WBC 0-4 0 - 4 /hpf    RBC 0-5 0 - 5 /hpf    Epithelial cells MODERATE (A) FEW /lpf    Bacteria 1+ (A) NEG /hpf    Hyaline cast 0-2 0 - 5 /lpf   HCG URINE, QL   Result Value Ref Range    HCG urine, Ql. NEGATIVE  NEG     GLUCOSE, FASTING   Result Value Ref Range    Glucose 100 65 - 100 MG/DL   LIPID PANEL   Result Value Ref Range    LIPID PROFILE          Cholesterol, total 162 <200 MG/DL    Triglyceride 63 <150 MG/DL    HDL Cholesterol 69 MG/DL    LDL, calculated 80.4 0 - 100 MG/DL    VLDL, calculated 12.6 MG/DL    CHOL/HDL Ratio 2.3 0 - 5.0     TSH 3RD GENERATION   Result Value Ref Range    TSH 2.15 0.36 - 3.74 uIU/mL   HCG URINE, QL. - POC   Result Value Ref Range    Pregnancy test,urine (POC) NEGATIVE  NEG         Immunizations administered during this encounter: There is no immunization history on file for this patient. Screening for Metabolic Disorders for Patients on Antipsychotic Medications    Estimated Body Mass Index  Estimated body mass index is 33.55 kg/(m^2) as calculated from the following:    Height as of this encounter: 5' 5\" (1.651 m). Weight as of this encounter: 91.4 kg (201 lb 9.6 oz). Vital Signs/Blood Pressure  Visit Vitals    /69    Pulse 93    Temp 98 °F (36.7 °C)    Resp 16    Ht 5' 5\" (1.651 m)    Wt 91.4 kg (201 lb 9.6 oz)    LMP 2017 (Exact Date)    SpO2 96%    BMI 33.55 kg/m2       Blood Glucose/Hemoglobin A1c  Lab Results   Component Value Date/Time    Glucose 100 2017 06:30 AM        No results found for: HBA1C, HGBE8, [id number]     Lipid Panel  Lab Results   Component Value Date/Time    Cholesterol, total 162 2017 06:30 AM    HDL Cholesterol 69 2017 06:30 AM    LDL, calculated 80.4 2017 06:30 AM    Triglyceride 63 2017 06:30 AM    CHOL/HDL Ratio 2.3 2017 06:30 AM       Discharge Diagnosis: Bipolar 1 disorder (ICD-10-CM: F31.9; ICD-9-CM: 296.7)    Discharge Plan:   Oh Reese  : 1975  MRN: [id number]    The patient Meenakshi Valerio exhibits the ability to control behavior in a less restrictive environment. Patient's level of functioning is improving. No assaultive/destructive behavior has been observed for the past 24 hours. No suicidal/homicidal threat or behavior has been observed for the past 24 hours. There is no evidence of serious medication side effects.   Patient has not been in physical or protective restraints for at least the past 24 hours. If weapons involved, how are they secured? No weapons involved. Is patient aware of and in agreement with discharge plan? Yes    Arrangements for medication:  Prescriptions given to patient. Patient is a smoker and needs referral for smoking cessation? Patient is not a smoker. 1.  Patient referred to the following for smoking cessation with an appointment:   2. Patient was offered medication to assist with smoking cessation at discharge:   3.  Education for smoking cessation added to discharge instructions:     Patient has a history of substance/alcohol abuse and requires a referral for treatment? No  1. Patient referred to the following for substance/alcohol abuse treatment with an appointment:     2. Patient was offered medication to assist with alcohol cessation at discharge:    3.  Education for substance/alcohol abuse added to discharge instructions:     Copy of discharge instructions to  provider?:  Delonte Diaz ([phone])    Arrangements for transportation home:   to   [address]- no  Name of Decision maker if patient has Psychiatric Care Directive: None  Patient was offered information, patient declined information. Keep all follow up appointments as scheduled, continue to take prescribed medications per physician instructions. Mental health crisis number:  [id number] or your local mental health crisis line number at [phone]    Discharge Medication List and Instructions:   Current Discharge Medication List      CONTINUE these medications which have CHANGED    Details   risperiDONE (RISPERDAL) 1 mg tablet Take 1 Tab by mouth nightly. Indications: DEPRESSION ASSOCIATED WITH BIPOLAR DISORDER  Qty: 30 Tab, Refills: 0         CONTINUE these medications which have NOT CHANGED    Details   lamoTRIgine (LAMICTAL) 150 mg tablet Take 150 mg by mouth two (2) times a day. Indications: MOOD      citalopram (CELEXA) 40 mg tablet Take 40 mg by mouth daily. Indications: major depressive disorder      fexofenadine-pseudoephedrine (ALLEGRA-D 24 HOUR) 180-240 mg per tablet Take 1 Tab by mouth daily. Indications: ALLERGIC RHINITIS      naproxen sodium (ALEVE) 220 mg cap Take 1 Tab by mouth two (2) times daily as needed for Pain. Indications: Pain      docusate sodium (COLACE) 100 mg capsule Take 100 mg by mouth daily. Indications: Constipation      lactase (LACTAID) 3,000 unit tablet Take 1 Tab by mouth three (3) times daily as needed for Other (LACTOSE INTOLERANCE). cholecalciferol, vitamin D3, 4,000 unit tab Take 4,000 Units by mouth daily. Indications: VITAMIN D DEFICIENCY             Unresulted Labs     None        To obtain results of studies pending at discharge, please contact N/A    Follow-up Information     Follow up With Details Comments Contact Info    Phys Other, MD   Patient can only remember the practice name and not the physician      Akbar Gunderson On 4/18/2017 You have a 4:00pm appointment for medication management. [facility]  [address] [facility], [facility]  [place], [address] [facility]  [phone]    [facility]  Couples counseling available here JERONIMO Vargas [address]  [address]  [place], [address]  [phone]    [facility]  Call to schedule first time counseling appointment. [facility]  [address]  [place], [address] [facility]  [phone]  Fax [phone]    Penelope Bland. Jim Ricketts, PhD  Call to schedule first time counseling appointment. [address] [facility], [address] [facility], [facility]  [phone]    [facility] Backer. [facility]  Call to schedule first time counseling appointment.  [address]  [address] [facility], [address]   [phone]          Advanced Care Plan:   Confirm Advance Directive: None     Patient Would Like to Complete Advance Directive: No        Does the patient have other document types: Organ Directive    Patient Instructions: Please continue all medications until otherwise directed by physician. What to do at Home:  Recommended activity: Activity as tolerated. If you experience any of the following symptoms thoughts of wanting to harm yourself or overwhelming anxiety, please follow up with [facility] or your local mental health crisis line number at [phone]. *  Please give a list of your current medications to your Primary Care Provider. *  Please update this list whenever your medications are discontinued, doses are      changed, or new medications (including over-the-counter products) are added. *  Please carry medication information at all times in case of emergency situations. Patient discharged to Home; discussed with patient/caregiver, provided to the patient/caregiver either in hard copy or electronically. and patient refused hard copy.

## 2017-04-04 NOTE — BH NOTES
GROUP THERAPY PROGRESS NOTE    Zack Cardozo participated in the General unit's Process Group, with a focus identifying feelings and planning for the day. Group time: 60 minutes. Personal goal for participation: To increase the capacity to improve ones mood and structure. Goal orientation: The patient will be able to identify their feelings, develop a plan for structuring their day, and discharge planning. Group therapy participation: With prompting, this patient participated in the group. Therapeutic interventions reviewed and discussed: The group members were asked to introduce themselves to each other and to see if they could identify an emotion they are having and/or let the group know what they want to focus on for the day as they continue to make discharge plans. Impression of participation: The patient said she wanted to work on [employer]" but also wants to allow enough time for her medications to work. She said she did not think she could put up with the other younger woman to come up from [place] and live with them. She added that she also did not want to deprive her  of his pleasure with her. She admitted growing up in a family with a father who was reportedly bipolar. The patient also said she was getting a new therapist who could meet with her on times when she does not plan to be working. It was suggested that the patient had a high tolerance for unstable relationships and asked to consider where she gets that. ..does it remind her of her family of origin? There were no SI/HI expressed and no overt psychotic symptoms present in this group.

## 2017-04-04 NOTE — DISCHARGE INSTRUCTIONS
DISCHARGE SUMMARY    NAME:Cherelle Holder  : 1975  MRN: [id number]    The patient Meenakshi Valerio exhibits the ability to control behavior in a less restrictive environment. Patient's level of functioning is improving. No assaultive/destructive behavior has been observed for the past 24 hours. No suicidal/homicidal threat or behavior has been observed for the past 24 hours. There is no evidence of serious medication side effects. Patient has not been in physical or protective restraints for at least the past 24 hours. If weapons involved, how are they secured? No weapons involved. Is patient aware of and in agreement with discharge plan? Yes    Arrangements for medication:  Prescriptions given to patient. Patient is a smoker and needs referral for smoking cessation? Patient is not a smoker. 1.  Patient referred to the following for smoking cessation with an appointment:   2. Patient was offered medication to assist with smoking cessation at discharge:   3.  Education for smoking cessation added to discharge instructions:     Patient has a history of substance/alcohol abuse and requires a referral for treatment? No  1. Patient referred to the following for substance/alcohol abuse treatment with an appointment:     2. Patient was offered medication to assist with alcohol cessation at discharge:    3.  Education for substance/alcohol abuse added to discharge instructions:     Copy of discharge instructions to  provider?:  Serge Ramirez ([phone])    Arrangements for transportation home:   to   [address]- no  Name of Decision maker if patient has Psychiatric Care Directive: None  Patient was offered information, patient declined information. Keep all follow up appointments as scheduled, continue to take prescribed medications per physician instructions.   Mental health crisis number:  [phone] or your local mental health crisis line number at [phone] from Nurse    The following personal items are in your possession at time of discharge:    Dental Appliances: Other (comment) (mouth guard)  Visual Aid: Glasses, With patient     Home Medications: Locked  Jewelry: None  Clothing: Footwear, Pants, Shirt, Shorts, Socks, Sweater, Undergarments  Other Valuables: [employer], Eyeglasses, Keys, Personal toiletries, Purse, Other (comment) (adapter, power cords, braces(wrists))  Personal Items Sent to Safe:  (credit cards, store cards, ipad tablet, license)          PATIENT INSTRUCTIONS:          What to do at Home:  Recommended activity: Activity as tolerated. If you experience any of the following symptoms thoughts of wanting to harm yourself or overwhelming anxiety, please follow up with [facility] or your local mental health crisis line number at [phone]. *  Please give a list of your current medications to your Primary Care Provider. *  Please update this list whenever your medications are discontinued, doses are      changed, or new medications (including over-the-counter products) are added. *  Please carry medication information at all times in case of emergency situations. These are general instructions for a healthy lifestyle:    No smoking/ No tobacco products/ Avoid exposure to second hand smoke    Surgeon General's Warning:  Quitting smoking now greatly reduces serious risk to your health. Obesity, smoking, and sedentary lifestyle greatly increases your risk for illness    A healthy diet, regular physical exercise & weight monitoring are important for maintaining a healthy lifestyle    You may be retaining fluid if you have a history of heart failure or if you experience any of the following symptoms:  Weight gain of 3 pounds or more overnight or 5 pounds in a week, increased swelling in our hands or feet or shortness of breath while lying flat in bed.   Please call your doctor as soon as you notice any of these symptoms; do not wait until your next office visit. Recognize signs and symptoms of STROKE:    F-face looks uneven    A-arms unable to move or move unevenly    S-speech slurred or non-existent    T-time-call 911 as soon as signs and symptoms begin-DO NOT go       Back to bed or wait to see if you get better-TIME IS BRAIN. Warning Signs of HEART ATTACK     Call 911 if you have these symptoms:   Chest discomfort. Most heart attacks involve discomfort in the center of the chest that lasts more than a few minutes, or that goes away and comes back. It can feel like uncomfortable pressure, squeezing, fullness, or pain.  Discomfort in other areas of the upper body. Symptoms can include pain or discomfort in one or both arms, the back, neck, jaw, or stomach.  Shortness of breath with or without chest discomfort.  Other signs may include breaking out in a cold sweat, nausea, or lightheadedness. Don't wait more than five minutes to call 911 - MINUTES MATTER! Fast action can save your life. Calling 911 is almost always the fastest way to get lifesaving treatment. Emergency Medical Services staff can begin treatment when they arrive -- up to an hour sooner than if someone gets to the hospital by car. The discharge information has been reviewed with the patient. The patient verbalized understanding. Discharge medications reviewed with the patient and appropriate educational materials and side effects teaching were provided.

## 2017-04-04 NOTE — PROGRESS NOTES
Participated in [facility] group. Today's topic for discussion was forgiveness. [facility] Staff  (Xander Downs Patient Care Specialist)   Paging Service [phone])

## 2017-04-04 NOTE — PROGRESS NOTES
Laboratory Monitoring for Antipsychotics and Mood Stabilizers    This patient is currently prescribed the following medication(s):   Current Facility-Administered Medications   Medication Dose Route Frequency    docusate sodium (COLACE) capsule 100 mg  100 mg Oral DAILY    citalopram (CELEXA) tablet 40 mg  40 mg Oral DAILY    lamoTRIgine (LaMICtal) tablet 150 mg  150 mg Oral BID    risperiDONE (RisperDAL) tablet 0.5 mg  0.5 mg Oral QHS    risperiDONE (RisperDAL) tablet 0.5 mg  0.5 mg Oral BID       The following labs have been completed for monitoring of antipsychotics and/or mood stabilizers:    Height, Weight, BMI Estimation  Estimated body mass index is 33.55 kg/(m^2) as calculated from the following:    Height as of this encounter: 165.1 cm (65\"). Weight as of this encounter: 91.4 kg (201 lb 9.6 oz). Vital Signs/Blood Pressure  Visit Vitals    /67    Pulse 71    Temp 98.3 °F (36.8 °C)    Resp 16    Ht 165.1 cm (65\")    Wt 91.4 kg (201 lb 9.6 oz)    LMP 03/24/2017 (Exact Date)    SpO2 97%    BMI 33.55 kg/m2       Renal Function, Hepatic Function and Chemistry  Estimated Creatinine Clearance: 89.8 mL/min (based on Cr of 0.94). Lab Results   Component Value Date/Time    Sodium 141 04/02/2017 10:38 PM    Potassium 3.9 04/02/2017 10:38 PM    Chloride 105 04/02/2017 10:38 PM    CO2 28 04/02/2017 10:38 PM    Anion gap 8 04/02/2017 10:38 PM    BUN 15 04/02/2017 10:38 PM    Creatinine 0.94 04/02/2017 10:38 PM    BUN/Creatinine ratio 16 04/02/2017 10:38 PM    Bilirubin, total 0.3 04/02/2017 10:38 PM    Protein, total 7.9 04/02/2017 10:38 PM    Albumin 3.9 04/02/2017 10:38 PM    Globulin 4.0 04/02/2017 10:38 PM    A-G Ratio 1.0 04/02/2017 10:38 PM    ALT (SGPT) 31 04/02/2017 10:38 PM    Alk.  phosphatase 112 04/02/2017 10:38 PM       Lab Results   Component Value Date/Time    Glucose 100 04/04/2017 06:30 AM       No results found for: HBA1C, HGBE8, [id number]    Hematology  Lab Results Component Value Date/Time    WBC 9.0 04/02/2017 10:38 PM    RBC 4.49 04/02/2017 10:38 PM    HGB 13.9 04/02/2017 10:38 PM    HCT 41.1 04/02/2017 10:38 PM    MCV 91.5 04/02/2017 10:38 PM    MCH 31.0 04/02/2017 10:38 PM    MCHC 33.8 04/02/2017 10:38 PM    RDW 12.4 04/02/2017 10:38 PM    PLATELET 185 26/92/9390 10:38 PM       Lipids  Lab Results   Component Value Date/Time    Cholesterol, total 162 04/04/2017 06:30 AM    HDL Cholesterol 69 04/04/2017 06:30 AM    LDL, calculated 80.4 04/04/2017 06:30 AM    Triglyceride 63 04/04/2017 06:30 AM    CHOL/HDL Ratio 2.3 04/04/2017 06:30 AM       Thyroid Function  Lab Results   Component Value Date/Time    TSH 2.15 04/04/2017 06:30 AM       Pregnancy Status  Lab Results   Component Value Date/Time    HCG urine, Ql. NEGATIVE  04/02/2017 10:38 PM    Pregnancy test,urine (POC) NEGATIVE  04/02/2017 10:16 PM       Assessment/Plan:  Recommended baseline laboratory monitoring has been completed based on this patient's current medication regimen.          Audie Cerda, PharmD, [address], BCPS

## 2017-04-04 NOTE — PROGRESS NOTES
Problem: Depressed Mood (Adult/Pediatric)  Goal: *STG: Participates in treatment plan  Outcome: Progressing Towards Goal  Pt participated in treatment team. No distress noted. Stated she is \"feeling better\". Concerns regarding breakfast choice. Plan for pt to be discharged today. Will continue to monitor. 11:14 am-stated she was able to sleep last night.

## 2017-04-04 NOTE — PROGRESS NOTES
Problem: Depressed Mood (Adult/Pediatric)  Goal: *STG: Participates in treatment plan  Outcome: Progressing Towards Goal  Pt's affect is bright. She verbalizes her needs appropriately and interacts with peers.

## 2017-04-04 NOTE — BH NOTES
GROUP THERAPY PROGRESS NOTE    Zack NEWBERRY Rolo Cordova is participating in reflections.      Group time: 15 minutes    Personal goal for participation: to share    Goal orientation: personal    Group therapy participation: active    Therapeutic interventions reviewed and discussed: goals    Impression of participation: met her goal